# Patient Record
Sex: FEMALE | Race: WHITE | NOT HISPANIC OR LATINO | Employment: FULL TIME | URBAN - METROPOLITAN AREA
[De-identification: names, ages, dates, MRNs, and addresses within clinical notes are randomized per-mention and may not be internally consistent; named-entity substitution may affect disease eponyms.]

---

## 2017-03-31 ENCOUNTER — OFFICE VISIT (OUTPATIENT)
Dept: FAMILY MEDICINE | Facility: CLINIC | Age: 22
End: 2017-03-31
Payer: COMMERCIAL

## 2017-03-31 VITALS
HEART RATE: 88 BPM | OXYGEN SATURATION: 99 % | BODY MASS INDEX: 26.8 KG/M2 | HEIGHT: 64 IN | WEIGHT: 157 LBS | DIASTOLIC BLOOD PRESSURE: 74 MMHG | SYSTOLIC BLOOD PRESSURE: 118 MMHG | TEMPERATURE: 97.2 F

## 2017-03-31 DIAGNOSIS — F90.2 ATTENTION DEFICIT HYPERACTIVITY DISORDER (ADHD), COMBINED TYPE: ICD-10-CM

## 2017-03-31 DIAGNOSIS — F33.1 MAJOR DEPRESSIVE DISORDER, RECURRENT EPISODE, MODERATE (H): Primary | ICD-10-CM

## 2017-03-31 DIAGNOSIS — F41.9 ANXIETY: ICD-10-CM

## 2017-03-31 PROCEDURE — 99213 OFFICE O/P EST LOW 20 MIN: CPT | Performed by: PEDIATRICS

## 2017-03-31 RX ORDER — LISDEXAMFETAMINE DIMESYLATE 70 MG/1
70 CAPSULE ORAL DAILY
Qty: 30 CAPSULE | Refills: 0 | Status: SHIPPED | OUTPATIENT
Start: 2017-05-01 | End: 2017-06-28

## 2017-03-31 RX ORDER — LISDEXAMFETAMINE DIMESYLATE 70 MG/1
70 CAPSULE ORAL DAILY
Qty: 30 CAPSULE | Refills: 0 | Status: SHIPPED | OUTPATIENT
Start: 2017-03-31 | End: 2017-06-28

## 2017-03-31 RX ORDER — LISDEXAMFETAMINE DIMESYLATE 70 MG/1
70 CAPSULE ORAL DAILY
Qty: 30 CAPSULE | Refills: 0 | Status: SHIPPED | OUTPATIENT
Start: 2017-06-01 | End: 2017-06-28

## 2017-03-31 RX ORDER — CITALOPRAM HYDROBROMIDE 10 MG/1
10 TABLET ORAL DAILY
Qty: 90 TABLET | Refills: 0 | Status: SHIPPED | OUTPATIENT
Start: 2017-03-31 | End: 2017-06-28

## 2017-03-31 ASSESSMENT — ANXIETY QUESTIONNAIRES
2. NOT BEING ABLE TO STOP OR CONTROL WORRYING: NOT AT ALL
GAD7 TOTAL SCORE: 8
6. BECOMING EASILY ANNOYED OR IRRITABLE: MORE THAN HALF THE DAYS
7. FEELING AFRAID AS IF SOMETHING AWFUL MIGHT HAPPEN: SEVERAL DAYS
IF YOU CHECKED OFF ANY PROBLEMS ON THIS QUESTIONNAIRE, HOW DIFFICULT HAVE THESE PROBLEMS MADE IT FOR YOU TO DO YOUR WORK, TAKE CARE OF THINGS AT HOME, OR GET ALONG WITH OTHER PEOPLE: VERY DIFFICULT
5. BEING SO RESTLESS THAT IT IS HARD TO SIT STILL: NEARLY EVERY DAY
3. WORRYING TOO MUCH ABOUT DIFFERENT THINGS: NOT AT ALL
1. FEELING NERVOUS, ANXIOUS, OR ON EDGE: SEVERAL DAYS

## 2017-03-31 ASSESSMENT — PATIENT HEALTH QUESTIONNAIRE - PHQ9: 5. POOR APPETITE OR OVEREATING: SEVERAL DAYS

## 2017-03-31 NOTE — PROGRESS NOTES
"SUBJECTIVE:                                                    Sofie Padilla is a 21 year old female who presents to clinic today with self because of:    Chief Complaint   Patient presents with     A.D.H.D     Depression        HPI:  Depression Follow-Up    Status since last visit: Worsened not taking medications    See PHQ-9 for current symptoms.    Other associated symptoms:None    Complicating factors:   Significant life event: No   Current substance abuse: None  Anxiety / Panic symptoms: Yes-  anxiety  PHQ-9  English PHQ-9   Any Language        ADHD Follow-Up    Date of last ADHD office visit: 6-29-16  Status since last visit: Worse not taking medications  Taking controlled (daily) medications as prescribed: No                                                                           ADHD Medication     Amphetamines Disp Start End    lisdexamfetamine (VYVANSE) 70 MG capsule 30 capsule 11/8/2016     Sig - Route: Take 1 capsule (70 mg) by mouth daily - Oral    Patient not taking:  Reported on 3/31/2017       Class: [employer]          School:  Name of SCHOOL: going back to college  Grade: college    Currently on a break.  Working 2 jobs, but struggling. Having difficulty lasting a whole shift, even a 5 hour one. Not really hard work, but \"I have to put all of my mental energy into doing what I'm supposed to do\" \"It's hard to do day after day\"     Sleep: no problems, tries to get at least 9 hours. Sometimes takes a little longer to fall asleep (30\"), but never more than an hr  Home/Family Concerns: Stable  Peer Concerns: None    Co-Morbid Diagnosis: Depression    Currently in counseling: No    Stopped both medications in December to see how she'd do. Has been struggling since.     Medication Benefits:   N/A currently    Medication side effects:  Parent/Patient Concerns with Medications: None when has taken in the past    ROS:  Negative for constitutional, eye, ear, nose, throat, skin, respiratory, cardiac, and " "gastrointestinal other than those outlined in the HPI.    PROBLEM LIST:  Patient Active Problem List    Diagnosis Date Noted     Anxiety 2016     Priority: Medium     Major depression in complete remission (HCC) 2016     Priority: Medium     History of varicella as a child 2014     Verruca plantaris 10/25/2012     Keratosis pilaris 2011     ADHD (attention deficit hyperactivity disorder) 2011     Adderall XR 3/2011 - 2012  Changed to Metadate ER because Adderall XR didn't seem to be working well enough. Concerta hadn't been covered by insurance in the past.  Didn't work any better so tried Vyvanse 10/2012  By 10/2014, didn't seem adequate enough so started trial of Concerta.       Adjustment disorder with disturbance of conduct 2011     Diagnosed at [facility]       Allergic rhinitis 2010     Mild major depression (H) 2010     On Celexa 10 mg since 2010        MEDICATIONS:  Current Outpatient Prescriptions   Medication Sig Dispense Refill     lisdexamfetamine (VYVANSE) 70 MG capsule Take 1 capsule (70 mg) by mouth daily (Patient not taking: Reported on 3/31/2017) 30 capsule 0     citalopram (CELEXA) 40 MG tablet Take 1 tablet (40 mg) by mouth daily (Patient not taking: Reported on 3/31/2017) 90 tablet 1      ALLERGIES:  Allergies   Allergen Reactions     Nkda [No Known Drug Allergies]        Problem list and histories reviewed & adjusted, as indicated.    OBJECTIVE:                                                      /74 (BP Location: Left arm, Patient Position: Chair, Cuff Size: Adult Regular)  Pulse 88  Temp 97.2  F (36.2  C) (Oral)  Ht 5' 4\" (1.626 m)  Wt 157 lb (71.2 kg)  SpO2 99%  Breastfeeding? No  BMI 26.95 kg/m2   Blood pressure percentiles are 79 % systolic and 82 % diastolic based on NHBPEP's 4th Report. Blood pressure percentile targets: 90: 123/78, 95: 127/82, 99 + 5 mmH/94.    GENERAL:  Alert and interactive., LUNGS: "  Clear, HEART:  Normal rate and rhythm.  Normal S1 and S2.  No murmurs. and NEURO:  No tics or tremor.     DIAGNOSTICS:   PHQ-9 SCORE 2/24/2016 6/29/2016 3/31/2017   Total Score - - -   Total Score 3 0 12     SALOMÓN-7 SCORE 2/24/2016 6/29/2016 3/31/2017   Total Score - - -   Total Score 12 1 8       ASSESSMENT/PLAN:                                                    (F33.1) Major depressive disorder, recurrent episode, moderate (H)  (primary encounter diagnosis)  (F41.9) Anxiety  Comment: restart medication, but will start and increase slowly  Plan: citalopram (CELEXA) 10 MG tablet    (F90.2) Attention deficit hyperactivity disorder (ADHD), combined type  Plan: lisdexamfetamine (VYVANSE) 70 MG capsule,         lisdexamfetamine (VYVANSE) 70 MG capsule,         lisdexamfetamine (VYVANSE) 70 MG capsule        Restarting at previous dose      FOLLOW UP: in 3 month(s)    Eliazar Dumont MD

## 2017-03-31 NOTE — NURSING NOTE
"Chief Complaint   Patient presents with     A.D.H.D     Depression       Initial /74 (BP Location: Left arm, Patient Position: Chair, Cuff Size: Adult Regular)  Pulse 88  Temp 97.2  F (36.2  C) (Oral)  Ht 5' 4\" (1.626 m)  Wt 157 lb (71.2 kg)  SpO2 99%  Breastfeeding? No  BMI 26.95 kg/m2 Estimated body mass index is 26.95 kg/(m^2) as calculated from the following:    Height as of this encounter: 5' 4\" (1.626 m).    Weight as of this encounter: 157 lb (71.2 kg).  Medication Reconciliation: complete   Thu RICHARDS MA      "

## 2017-03-31 NOTE — MR AVS SNAPSHOT
After Visit Summary   3/31/2017    Sofie Padilla    MRN: [id number]           Patient Information     Date Of Birth          1995        Visit Information        Provider Department      3/31/2017 10:20 AM Eliazar Dumont MD [facility]        Today's Diagnoses     Need for HPV vaccine    -  1    Major depressive disorder, recurrent episode, moderate (H)        Anxiety        Attention deficit hyperactivity disorder (ADHD), combined type          Care Instructions    [facility]    If you have any questions regarding to your visit please contact your care team:       Team Red:   Clinic Hours Telephone Number   Dr. Graciela De Leon, NP   7am-7pm  Monday - Thursday   7am-5pm  Fridays  [phone]  (Appointment scheduling available 24/7)    Questions about your visit?   Team Line  [phone]   Urgent Care - [place] - 11am-9pm Monday-Friday Saturday-Sunday- 9am-5pm   [place] - 5pm-9pm Monday-Friday Saturday-Sunday- 9am-5pm  [phone] - [facility]  [phone] - [place]       What options do I have for visits at the clinic other than the traditional office visit?  To expand how we care for you, many of our providers are utilizing electronic visits (e-visits) and telephone visits, when medically appropriate, for interactions with their patients rather than a visit in the clinic.   We also offer nurse visits for many medical concerns. Just like any other service, we will bill your insurance company for this type of visit based on time spent on the phone with your provider. Not all insurance companies cover these visits. Please check with your medical insurance if this type of visit is covered. You will be responsible for any charges that are not paid by your insurance.      E-visits via [employer]:  generally incur a $35.00 fee.  Telephone visits:  Time spent on the phone:  "*charged based on time that is spent on the phone in increments of 10 minutes. Estimated cost:   5-10 mins $30.00   11-20 mins. $59.00   21-30 mins. $85.00     Use Nirvah[employer] (secure email communication and access to your chart) to send your primary care provider a message or make an appointment. Ask someone on your Team how to sign up for [employer].  For a Price Quote for your services, please call our Major Aide Line at [phone].      As always, Thank you for trusting us with your health care needs!  Amos Storey          Follow-ups after your visit        Who to contact     If you have questions or need follow up information about today's clinic visit or your schedule please contact [facility] directly at [phone].  Normal or non-critical lab and imaging results will be communicated to you by Nirvahahart, letter or phone within 4 business days after the clinic has received the results. If you do not hear from us within 7 days, please contact the clinic through Nirvahahart or phone. If you have a critical or abnormal lab result, we will notify you by phone as soon as possible.  Submit refill requests through [employer] or call your pharmacy and they will forward the refill request to us. Please allow 3 business days for your refill to be completed.          Additional Information About Your Visit        JJ [employer]MA Information     [employer] lets you send messages to your doctor, view your test results, renew your prescriptions, schedule appointments and more. To sign up, go to [email or web address] . Click on \"Log in\" on the left side of the screen, which will take you to the Welcome page. Then click on \"Sign up Now\" on the right side of the page.     You will be asked to enter the access code listed below, as well as some personal information. Please follow the directions to create your username and password.     Your access code is: [email or web address]  Expires: 6/29/2017 11:18 AM     Your access code will " " in 90 days. If you need help or a new code, please call your [place] clinic or [phone].        Care EveryWhere ID     This is your Care EveryWhere ID. This could be used by other organizations to access your [place] medical records  [id number]        Your Vitals Were     Pulse Temperature Height Pulse Oximetry Breastfeeding? BMI (Body Mass Index)    88 97.2  F (36.2  C) (Oral) 5' 4\" (1.626 m) 99% No 26.95 kg/m2       Blood Pressure from Last 3 Encounters:   17 118/74   16 111/66   16 142/85    Weight from Last 3 Encounters:   17 157 lb (71.2 kg) (86 %)*   16 138 lb (62.6 kg) (71 %)*   16 129 lb 3.2 oz (58.6 kg) (59 %)*     * Growth percentiles are based on [facility] 2-20 Years data.              We Performed the Following     DEPRESSION ACTION PLAN (DAP) Order [[id number]]          Today's Medication Changes          These changes are accurate as of: 3/31/17 11:18 AM.  If you have any questions, ask your nurse or doctor.               Start taking these medicines.        Dose/Directions    citalopram 10 MG tablet   Commonly known as:  celeXA   Used for:  Major depressive disorder, recurrent episode, moderate (H), Anxiety   Started by:  Eliazar Dumont MD        Dose:  10 mg   Take 1 tablet (10 mg) by mouth daily   Quantity:  90 tablet   Refills:  0       * lisdexamfetamine 70 MG capsule   Commonly known as:  VYVANSE   Used for:  Attention deficit hyperactivity disorder (ADHD), combined type   Started by:  Eliazar Dumont MD        Dose:  70 mg   Take 1 capsule (70 mg) by mouth daily   Quantity:  30 capsule   Refills:  0       * lisdexamfetamine 70 MG capsule   Commonly known as:  VYVANSE   Used for:  Attention deficit hyperactivity disorder (ADHD), combined type   Started by:  Eliazar Dumont MD        Dose:  70 mg   Start taking on:  2017   Take 1 capsule (70 mg) by mouth daily   Quantity:  30 capsule   Refills:  0       * " lisdexamfetamine 70 MG capsule   Commonly known as:  VYVANSE   Used for:  Attention deficit hyperactivity disorder (ADHD), combined type   Started by:  Eliazar Dumont MD        Dose:  70 mg   Start taking on:  6/1/2017   Take 1 capsule (70 mg) by mouth daily   Quantity:  30 capsule   Refills:  0       * Notice:  This list has 3 medication(s) that are the same as other medications prescribed for you. Read the directions carefully, and ask your doctor or other care provider to review them with you.         Where to get your medicines      These medications were sent to [facility] [id number] [place], [place] [address] AT [facility] [place] [address], [facility] [address]     Phone:  [phone]     citalopram 10 MG tablet         Some of these will need a paper prescription and others can be bought over the counter.  Ask your nurse if you have questions.     Bring a paper prescription for each of these medications     lisdexamfetamine 70 MG capsule    lisdexamfetamine 70 MG capsule    lisdexamfetamine 70 MG capsule                Primary Care Provider Office Phone # Fax #    Eliazar Dumont [phone]       [address] [address]        Thank you!     Thank you for choosing [facility]  for your care. Our goal is always to provide you with excellent care. Hearing back from our patients is one way we can continue to improve our services. Please take a few minutes to complete the written survey that you may receive in the mail after your visit with us. Thank you!             Your Updated Medication List - Protect others around you: Learn how to safely use, store and throw away your medicines at www.disposemymeds.org.          This list is accurate as of: 3/31/17 11:18 AM.  Always use your most recent med list.                   Brand Name Dispense Instructions for use     citalopram 10 MG tablet    celeXA    90 tablet    Take 1 tablet (10 mg) by mouth daily       * lisdexamfetamine 70 MG capsule    VYVANSE    30 capsule    Take 1 capsule (70 mg) by mouth daily       * lisdexamfetamine 70 MG capsule   Start taking on:  5/1/2017    VYVANSE    30 capsule    Take 1 capsule (70 mg) by mouth daily       * lisdexamfetamine 70 MG capsule   Start taking on:  6/1/2017    VYVANSE    30 capsule    Take 1 capsule (70 mg) by mouth daily       * Notice:  This list has 3 medication(s) that are the same as other medications prescribed for you. Read the directions carefully, and ask your doctor or other care provider to review them with you.

## 2017-03-31 NOTE — LETTER
My Depression Action Plan  Name: Sofie Padilla   Date of Birth 1995  Date: 3/31/2017    My doctor: Eliazar Dumont   My clinic: [address] [address]  [phone]          GREEN    ZONE   Good Control    What it looks like:     Things are going generally well. You have normal up s and down s. You may even feel depressed from time to time, but bad moods usually last less than a day.   What you need to do:  1. Continue to care for yourself (see self care plan)  2. Check your depression survival kit and update it as needed  3. Follow your physician s recommendations including any medication.  4. Do not stop taking medication unless you consult with your physician first.           YELLOW         ZONE Getting Worse    What it looks like:     Depression is starting to interfere with your life.     It may be hard to get out of bed; you may be starting to isolate yourself from others.    Symptoms of depression are starting to last most all day and this has happened for several days.     You may have suicidal thoughts but they are not constant.   What you need to do:     1. Call your care team, your response to treatment will improve if you keep your care team informed of your progress. Yellow periods are signs an adjustment may need to be made.     2. Continue your self-care, even if you have to fake it!    3. Talk to someone in your support network    4. Open up your depression survival kit           RED    ZONE Medical Alert - Get Help    What it looks like:     Depression is seriously interfering with your life.     You may experience these or other symptoms: You can t get out of bed most days, can t work or engage in other necessary activities, you have trouble taking care of basic hygiene, or basic responsibilities, thoughts of suicide or death that will not go away, self-injurious behavior.     What you need to do:  1. Call your  care team and request a same-day appointment. If they are not available (weekends or after hours) call your local crisis line, emergency room or 911.      Electronically signed by: Eliazar Dumont, March 31, 2017    Depression Self Care Plan / Survival Kit    Self-Care for Depression  Here s the deal. Your body and mind are really not as separate as most people think.  What you do and think affects how you feel and how you feel influences what you do and think. This means if you do things that people who feel good do, it will help you feel better.  Sometimes this is all it takes.  There is also a place for medication and therapy depending on how severe your depression is, so be sure to consult with your medical provider and/ or Behavioral Health Consultant if your symptoms are worsening or not improving.     In order to better manage my stress, I will:    Exercise  Get some form of exercise, every day. This will help reduce pain and release endorphins, the  feel good  chemicals in your brain. This is almost as good as taking antidepressants!  This is not the same as joining a gym and then never going! (they count on that by the way ) It can be as simple as just going for a walk or doing some gardening, anything that will get you moving.      Hygiene   Maintain good hygiene (Get out of bed in the morning, Make your bed, Brush your teeth, Take a shower, and Get dressed like you were going to work, even if you are unemployed).  If your clothes don't fit try to get ones that do.    Diet  I will strive to eat foods that are good for me, drink plenty of water, and avoid excessive sugar, caffeine, alcohol, and other mood-altering substances.  Some foods that are helpful in depression are: complex carbohydrates, B vitamins, flaxseed, fish or fish oil, fresh fruits and vegetables.    Psychotherapy  I agree to participate in Individual Therapy (if recommended).    Medication  If prescribed medications, I agree to take  them.  Missing doses can result in serious side effects.  I understand that drinking alcohol, or other illicit drug use, may cause potential side effects.  I will not stop my medication abruptly without first discussing it with my provider.    Staying Connected With Others  I will stay in touch with my friends, family members, and my primary care provider/team.    Use your imagination  Be creative.  We all have a creative side; it doesn t matter if it s oil painting, sand castles, or mud pies! This will also kick up the endorphins.    Witness Beauty  (AKA stop and smell the roses) Take a look outside, even in mid-winter. Notice colors, textures. Watch the squirrels and birds.     Service to others  Be of service to others.  There is always someone else in need.  By helping others we can  get out of ourselves  and remember the really important things.  This also provides opportunities for practicing all the other parts of the program.    Humor  Laugh and be silly!  Adjust your TV habits for less news and crime-drama and more comedy.    Control your stress  Try breathing deep, massage therapy, biofeedback, and meditation. Find time to relax each day.     My support system    Clinic Contact:  Phone number:    Contact 1:  Phone number:    Contact 2:  Phone number:    [employer]/:  Phone number:    Therapist:  Phone number:    Local crisis center:    Phone number:    Other community support:  Phone number:

## 2017-03-31 NOTE — PATIENT INSTRUCTIONS
[facility]    If you have any questions regarding to your visit please contact your care team:       Team Red:   Clinic Hours Telephone Number   Dr. Graciela De Leon, NP   7am-7pm  Monday - Thursday   7am-5pm  Fridays  [phone]  (Appointment scheduling available 24/7)    Questions about your visit?   Team Line  [phone]   Urgent Care - [place] - 11am-9pm Monday-Friday Saturday-Sunday- 9am-5pm   [place] - 5pm-9pm Monday-Friday Saturday-Sunday- 9am-5pm  [phone] - [place]   [phone] - [place]       What options do I have for visits at the clinic other than the traditional office visit?  To expand how we care for you, many of our providers are utilizing electronic visits (e-visits) and telephone visits, when medically appropriate, for interactions with their patients rather than a visit in the clinic.   We also offer nurse visits for many medical concerns. Just like any other service, we will bill your insurance company for this type of visit based on time spent on the phone with your provider. Not all insurance companies cover these visits. Please check with your medical insurance if this type of visit is covered. You will be responsible for any charges that are not paid by your insurance.      E-visits via [employer]:  generally incur a $35.00 fee.  Telephone visits:  Time spent on the phone: *charged based on time that is spent on the phone in increments of 10 minutes. Estimated cost:   5-10 mins $30.00   11-20 mins. $59.00   21-30 mins. $85.00     Use [employer] (secure email communication and access to your chart) to send your primary care provider a message or make an appointment. Ask someone on your Team how to sign up for [employer].  For a Price Quote for your services, please call our Consumer Price Line at [phone].      As always, Thank you for trusting us with your health care needs!  Amos LOPEZ  [place]

## 2017-04-01 ASSESSMENT — PATIENT HEALTH QUESTIONNAIRE - PHQ9: SUM OF ALL RESPONSES TO PHQ QUESTIONS 1-9: 12

## 2017-04-01 ASSESSMENT — ANXIETY QUESTIONNAIRES: GAD7 TOTAL SCORE: 8

## 2017-06-27 DIAGNOSIS — F41.9 ANXIETY: ICD-10-CM

## 2017-06-27 DIAGNOSIS — F33.1 MAJOR DEPRESSIVE DISORDER, RECURRENT EPISODE, MODERATE (H): ICD-10-CM

## 2017-06-27 NOTE — TELEPHONE ENCOUNTER
Routing refill request to provider for review/approval because:  Med was restarted on low dose at last visit.  Has f/u appointment tomorrow      Aline Sherman RN

## 2017-06-27 NOTE — TELEPHONE ENCOUNTER
citalopram (CELEXA) 10 MG tablet     Last Written Prescription Date: 3/31/17  Last Fill Quantity: 90, # refills: 0  Last Office Visit with [facility] primary care provider:  3/31/17   Next 5 appointments (look out 90 days)     Jun 28, 2017  2:00 PM CDT   [facility]    [address]  [place] [address] [phone]                   Last PHQ-9 score on record=   PHQ-9 SCORE 3/31/2017   Total Score -   Total Score 12

## 2017-06-28 ENCOUNTER — OFFICE VISIT (OUTPATIENT)
Dept: FAMILY MEDICINE | Facility: CLINIC | Age: 22
End: 2017-06-28
Payer: COMMERCIAL

## 2017-06-28 VITALS
RESPIRATION RATE: 16 BRPM | BODY MASS INDEX: 24.1 KG/M2 | HEART RATE: 90 BPM | HEIGHT: 63 IN | DIASTOLIC BLOOD PRESSURE: 76 MMHG | WEIGHT: 136 LBS | SYSTOLIC BLOOD PRESSURE: 131 MMHG | TEMPERATURE: 98.7 F

## 2017-06-28 DIAGNOSIS — F90.2 ATTENTION DEFICIT HYPERACTIVITY DISORDER (ADHD), COMBINED TYPE: ICD-10-CM

## 2017-06-28 DIAGNOSIS — L01.00 IMPETIGO: Primary | ICD-10-CM

## 2017-06-28 DIAGNOSIS — F41.9 ANXIETY: ICD-10-CM

## 2017-06-28 DIAGNOSIS — F33.40 MAJOR DEPRESSIVE DISORDER, RECURRENT, IN REMISSION, UNSPECIFIED (H): ICD-10-CM

## 2017-06-28 PROCEDURE — 99213 OFFICE O/P EST LOW 20 MIN: CPT | Performed by: PEDIATRICS

## 2017-06-28 RX ORDER — LISDEXAMFETAMINE DIMESYLATE 70 MG/1
70 CAPSULE ORAL DAILY
Qty: 30 CAPSULE | Refills: 0 | Status: SHIPPED | OUTPATIENT
Start: 2017-06-28 | End: 2017-07-28

## 2017-06-28 RX ORDER — LISDEXAMFETAMINE DIMESYLATE 70 MG/1
70 CAPSULE ORAL DAILY
Qty: 30 CAPSULE | Refills: 0 | Status: SHIPPED | OUTPATIENT
Start: 2017-07-29 | End: 2017-08-28

## 2017-06-28 RX ORDER — LISDEXAMFETAMINE DIMESYLATE 70 MG/1
70 CAPSULE ORAL DAILY
Qty: 30 CAPSULE | Refills: 0 | Status: CANCELLED | OUTPATIENT
Start: 2017-06-28

## 2017-06-28 RX ORDER — CITALOPRAM HYDROBROMIDE 10 MG/1
10 TABLET ORAL DAILY
Qty: 90 TABLET | Refills: 1 | Status: SHIPPED | OUTPATIENT
Start: 2017-06-28 | End: 2018-02-01

## 2017-06-28 RX ORDER — LISDEXAMFETAMINE DIMESYLATE 70 MG/1
70 CAPSULE ORAL DAILY
Qty: 30 CAPSULE | Refills: 0 | Status: SHIPPED | OUTPATIENT
Start: 2017-08-29 | End: 2017-10-04

## 2017-06-28 ASSESSMENT — ANXIETY QUESTIONNAIRES
3. WORRYING TOO MUCH ABOUT DIFFERENT THINGS: NOT AT ALL
1. FEELING NERVOUS, ANXIOUS, OR ON EDGE: SEVERAL DAYS
6. BECOMING EASILY ANNOYED OR IRRITABLE: NOT AT ALL
7. FEELING AFRAID AS IF SOMETHING AWFUL MIGHT HAPPEN: SEVERAL DAYS
5. BEING SO RESTLESS THAT IT IS HARD TO SIT STILL: NOT AT ALL
2. NOT BEING ABLE TO STOP OR CONTROL WORRYING: NOT AT ALL
GAD7 TOTAL SCORE: 2

## 2017-06-28 ASSESSMENT — PATIENT HEALTH QUESTIONNAIRE - PHQ9: 5. POOR APPETITE OR OVEREATING: NOT AT ALL

## 2017-06-28 NOTE — PROGRESS NOTES
SUBJECTIVE:                                                    Sofie Padilla is a 21 year old female who presents to clinic today for the following health issues:      Chief Complaint   Patient presents with     A.D.H.D     Recheck Medication     Restarted her citalopram about 3 months ago and feels it is helping a lot. She had previously been on as high as 40 mg, but restarted at 10mg and she thinks it's right for her.    Still taking the Vyvanse and finds it necessary and effective. No side effects.    New work schedule. Sofie really likes it, feels lighter to her because of how it breaks up the day and no late hours.  6-11 M-F T[employer] Humberto  2-6 M-F Alfred    Went camping last week, really enjoyed it.  On Mon, going to [place] to see sister for a couple weeks    Concerns: impetigo?  Impetigo right nostril since Sun. Using  mupirocin, helping some, but not completely. No other lesions.    Problem list and histories reviewed & adjusted, as indicated.  Additional history: as documented        Patient Active Problem List   Diagnosis     Mild major depression (H)     Allergic rhinitis     Adjustment disorder with disturbance of conduct     ADHD (attention deficit hyperactivity disorder)     Keratosis pilaris     Verruca plantaris     History of varicella as a child     Anxiety     Major depressive disorder, recurrent episode, moderate (H)     Past Surgical History:   Procedure Laterality Date     PE TUBES  age 3     TYMPANOPLASTY  3/19/2013    Procedure: TYMPANOPLASTY;  Right fat graft tympanoplasty;  Surgeon: Jersey Ricardo MD;  Location:  OR       Social History   Substance Use Topics     Smoking status: Never Smoker     Smokeless tobacco: Never Used      Comment: non-smoking household     Alcohol use No     Family History   Problem Relation Age of Onset     C.A.D. Father      DIABETES Father      Pre-diabetes     Arthritis Maternal Grandmother      Cardiovascular Maternal Grandmother      heart disease  "    Alzheimer Disease Paternal Grandfather      Hypertension Paternal Grandfather      Asthma Brother      Depression Mother      Depression Maternal Grandfather      HEART DISEASE Maternal Aunt      enlarged heart     HEART DISEASE Maternal Aunt      enlarged heart     CANCER Maternal Aunt      HEART DISEASE Maternal Uncle      enlarged heart     GASTROINTESTINAL DISEASE Maternal Uncle      colitis     CEREBROVASCULAR DISEASE No family hx of      Thyroid Disease No family hx of      Glaucoma No family hx of      Macular Degeneration No family hx of          Current Outpatient Prescriptions   Medication Sig Dispense Refill     mupirocin (BACTROBAN NASAL) 2 % nasal ointment Apply to affected area 3 times/day for 5-7 days 22 g 0     citalopram (CELEXA) 10 MG tablet Take 1 tablet (10 mg) by mouth daily 90 tablet 1     lisdexamfetamine (VYVANSE) 70 MG capsule Take 1 capsule (70 mg) by mouth daily 30 capsule 0     [START ON 7/29/2017] lisdexamfetamine (VYVANSE) 70 MG capsule Take 1 capsule (70 mg) by mouth daily 30 capsule 0     [START ON 8/29/2017] lisdexamfetamine (VYVANSE) 70 MG capsule Take 1 capsule (70 mg) by mouth daily 30 capsule 0     lisdexamfetamine (VYVANSE) 70 MG capsule Take 1 capsule (70 mg) by mouth daily 30 capsule 0     Allergies   Allergen Reactions     Nkda [No Known Drug Allergies]        Reviewed and updated as needed this visit by clinical staff  Tobacco  Allergies  Meds  Med Hx  Surg Hx  Fam Hx  Soc Hx      Reviewed and updated as needed this visit by Provider         ROS:  Constitutional, HEENT, cardiovascular, pulmonary, gi and gu systems are negative, except as otherwise noted.    OBJECTIVE:     /76  Pulse 90  Temp 98.7  F (37.1  C)  Resp 16  Ht 5' 2.5\" (1.588 m)  Wt 136 lb (61.7 kg)  BMI 24.48 kg/m2  Body mass index is 24.48 kg/(m^2).  GENERAL: healthy, alert and no distress  SKIN: crusting with mild erythema along right nare extending towards philtrum  NECK: no adenopathy, " no asymmetry, masses, or scars and thyroid normal to palpation  RESP: lungs clear to auscultation - no rales, rhonchi or wheezes  CV: regular rate and rhythm, normal S1 S2, no S3 or S4, no murmur, click or rub  PSYCH: mentation appears normal, affect normal/bright    Diagnostic Test Results:  none     ASSESSMENT/PLAN:     Depression; recurrent episode-- Full remission   Associated with the following complications:    None   Plan:  No changes in the patient's current treatment plan    (L01.00) Impetigo  (primary encounter diagnosis)  Plan: mupirocin (BACTROBAN NASAL) 2 % nasal ointment        Discussed instructions on proper medication use and possible side effects.    (F33.40) Major depressive disorder, recurrent, in remission, unspecified (H)  (F41.9) Anxiety  Comment: doing very well  Plan: citalopram (CELEXA) 10 MG tablet            (F90.2) Attention deficit hyperactivity disorder (ADHD), combined type  Comment: stable  Plan: lisdexamfetamine (VYVANSE) 70 MG capsule,         lisdexamfetamine (VYVANSE) 70 MG capsule,         lisdexamfetamine (VYVANSE) 70 MG capsule              FUTURE APPOINTMENTS:       - Follow-up visit in 6 months    Eliazar Dumont MD  [facility]

## 2017-06-28 NOTE — NURSING NOTE
"Chief Complaint   Patient presents with     A.D.H.D     Recheck Medication       Initial /76  Pulse 90  Temp 98.7  F (37.1  C)  Resp 16  Ht 5' 2.5\" (1.588 m)  Wt 136 lb (61.7 kg)  BMI 24.48 kg/m2 Estimated body mass index is 24.48 kg/(m^2) as calculated from the following:    Height as of this encounter: 5' 2.5\" (1.588 m).    Weight as of this encounter: 136 lb (61.7 kg).  Medication Reconciliation: complete     Jose F Galeas. MA      "

## 2017-06-28 NOTE — MR AVS SNAPSHOT
After Visit Summary   6/28/2017    Sofie Padilla    MRN: [id number]           Patient Information     Date Of Birth          1995        Visit Information        Provider Department      6/28/2017 2:00 PM Eliazar Dumont MD [facility]        Today's Diagnoses     Impetigo    -  1    Major depressive disorder, recurrent episode, moderate (H)        Anxiety        Attention deficit hyperactivity disorder (ADHD), combined type          Care Instructions    [facility]    If you have any questions regarding to your visit please contact your care team:       Team Red:   Clinic Hours Telephone Number   Dr. Graciela Dumont  (pediatrics)  Abbey De Leon NP 7am-7pm  Monday - Thursday   7am-5pm  Fridays  [phone] (fax)    [place] OSBORN  [phone]   Urgent Care - [place] and [place] Monday-Friday  [place] - 11am-8pm  Saturday-Sunday  Both sites - 9am-5pm  [phone] - [facility]  [phone] - [place]       What options do I have for visits at the clinic other than the traditional office visit?  To expand how we care for you, many of our providers are utilizing electronic visits (e-visits) and telephone visits, when medically appropriate, for interactions with their patients rather than a visit in the clinic.   We also offer nurse visits for many medical concerns. Just like any other service, we will bill your insurance company for this type of visit based on time spent on the phone with your provider. Not all insurance companies cover these visits. Please check with your medical insurance if this type of visit is covered. You will be responsible for any charges that are not paid by your insurance.      E-visits via [employer]:  generally incur a $35.00 fee.  Telephone visits:  Time spent on the phone: *charged based on time that is spent on the phone in increments of 10 minutes. Estimated  "cost:   5-10 mins $30.00   11-20 mins. $59.00   21-30 mins. $85.00     As always, Thank you for trusting us with your health care needs!                      Follow-ups after your visit        Who to contact     If you have questions or need follow up information about today's clinic visit or your schedule please contact [facility] directly at [phone].  Normal or non-critical lab and imaging results will be communicated to you by eCareerhart, letter or phone within 4 business days after the clinic has received the results. If you do not hear from us within 7 days, please contact the clinic through OneEyeAntt or phone. If you have a critical or abnormal lab result, we will notify you by phone as soon as possible.  Submit refill requests through AutomateIt or call your pharmacy and they will forward the refill request to us. Please allow 3 business days for your refill to be completed.          Additional Information About Your Visit        eCareerharRF Controls Information     AutomateIt lets you send messages to your doctor, view your test results, renew your prescriptions, schedule appointments and more. To sign up, go to [email or web address] . Click on \"Log in\" on the left side of the screen, which will take you to the Welcome page. Then click on \"Sign up Now\" on the right side of the page.     You will be asked to enter the access code listed below, as well as some personal information. Please follow the directions to create your username and password.     Your access code is: [email or web address]  Expires: 2017 11:18 AM     Your access code will  in 90 days. If you need help or a new code, please call your [facility] clinic or [phone].        Care EveryWhere ID     This is your Care EveryWhere ID. This could be used by other organizations to access your [facility] medical records  [id number]        Your Vitals Were     Pulse Temperature Respirations Height BMI (Body Mass Index)       90 98.7  F (37.1  C) 16 5' " "2.5\" (1.588 m) 24.48 kg/m2        Blood Pressure from Last 3 Encounters:   06/28/17 131/76   03/31/17 118/74   06/29/16 111/66    Weight from Last 3 Encounters:   06/28/17 136 lb (61.7 kg) (64 %)*   03/31/17 157 lb (71.2 kg) (86 %)*   06/29/16 138 lb (62.6 kg) (71 %)*     * Growth percentiles are based on [facility] 2-20 Years data.              Today, you had the following     No orders found for display         Today's Medication Changes          These changes are accurate as of: 6/28/17  2:51 PM.  If you have any questions, ask your nurse or doctor.               Start taking these medicines.        Dose/Directions    mupirocin 2 % nasal ointment   Commonly known as:  BACTROBAN NASAL   Used for:  Impetigo   Started by:  Eliazar Dumont MD        Apply to affected area 3 times/day for 5-7 days   Quantity:  22 g   Refills:  0         These medicines have changed or have updated prescriptions.        Dose/Directions    * lisdexamfetamine 70 MG capsule   Commonly known as:  VYVANSE   This may have changed:  Another medication with the same name was added. Make sure you understand how and when to take each.   Used for:  Attention deficit hyperactivity disorder (ADHD), combined type   Changed by:  Eliazar Dumont MD        Dose:  70 mg   Take 1 capsule (70 mg) by mouth daily   Quantity:  30 capsule   Refills:  0       * lisdexamfetamine 70 MG capsule   Commonly known as:  VYVANSE   This may have changed:  You were already taking a medication with the same name, and this prescription was added. Make sure you understand how and when to take each.   Used for:  Attention deficit hyperactivity disorder (ADHD), combined type   Changed by:  Eliazar Dumont MD        Dose:  70 mg   Take 1 capsule (70 mg) by mouth daily   Quantity:  30 capsule   Refills:  0       * lisdexamfetamine 70 MG capsule   Commonly known as:  VYVANSE   This may have changed:  You were already taking a medication " with the same name, and this prescription was added. Make sure you understand how and when to take each.   Used for:  Attention deficit hyperactivity disorder (ADHD), combined type   Changed by:  Eliazar Dumont MD        Dose:  70 mg   Start taking on:  7/29/2017   Take 1 capsule (70 mg) by mouth daily   Quantity:  30 capsule   Refills:  0       * lisdexamfetamine 70 MG capsule   Commonly known as:  VYVANSE   This may have changed:  You were already taking a medication with the same name, and this prescription was added. Make sure you understand how and when to take each.   Used for:  Attention deficit hyperactivity disorder (ADHD), combined type   Changed by:  Eliazar Dumotn MD        Dose:  70 mg   Start taking on:  8/29/2017   Take 1 capsule (70 mg) by mouth daily   Quantity:  30 capsule   Refills:  0       * Notice:  This list has 4 medication(s) that are the same as other medications prescribed for you. Read the directions carefully, and ask your doctor or other care provider to review them with you.         Where to get your medicines      These medications were sent to [facility] [address] [place] [address] RD S AT [facility] [place] [id number], [place] [place] [address]     Phone:  [phone]     citalopram 10 MG tablet    mupirocin 2 % nasal ointment         Some of these will need a paper prescription and others can be bought over the counter.  Ask your nurse if you have questions.     Bring a paper prescription for each of these medications     lisdexamfetamine 70 MG capsule    lisdexamfetamine 70 MG capsule    lisdexamfetamine 70 MG capsule                Primary Care Provider Office Phone # Fax #    Eliazar Dumont [phone]       [facility] [address] [place] [address]        Equal Access to Services     TOSHA ARMENTA AH: ashutosh Altamirano  judy kassi scott, marline dumont ah. So [facility] [phone].    ATENCIÓN: Si vinita owen, tiene a noyola disposición servicios gratuitos de asistencia lingüística. Llame al [phone].    We comply with applicable federal civil rights laws and [place] laws. We do not discriminate on the basis of race, color, national origin, age, disability sex, sexual orientation or gender identity.            Thank you!     Thank you for choosing [facility]3] [facility]  for your care. Our goal is always to provide you with excellent care. Hearing back from our patients is one way we can continue to improve our services. Please take a few minutes to complete the written survey that you may receive in the mail after your visit with us. Thank you!             Your Updated Medication List - Protect others around you: Learn how to safely use, store and throw away your medicines at www.disposemymeds.org.          This list is accurate as of: 6/28/17  2:51 PM.  Always use your most recent med list.                   Brand Name Dispense Instructions for use Diagnosis    citalopram 10 MG tablet    celeXA    90 tablet    Take 1 tablet (10 mg) by mouth daily    Major depressive disorder, recurrent episode, moderate (H), Anxiety       * lisdexamfetamine 70 MG capsule    VYVANSE    30 capsule    Take 1 capsule (70 mg) by mouth daily    Attention deficit hyperactivity disorder (ADHD), combined type       * lisdexamfetamine 70 MG capsule    VYVANSE    30 capsule    Take 1 capsule (70 mg) by mouth daily    Attention deficit hyperactivity disorder (ADHD), combined type       * lisdexamfetamine 70 MG capsule   Start taking on:  7/29/2017    VYVANSE    30 capsule    Take 1 capsule (70 mg) by mouth daily    Attention deficit hyperactivity disorder (ADHD), combined type       * lisdexamfetamine 70 MG capsule   Start taking on:  8/29/2017    VYVANSE    30 capsule    Take 1 capsule (70 mg) by mouth daily     Attention deficit hyperactivity disorder (ADHD), combined type       mupirocin 2 % nasal ointment    BACTROBAN NASAL    22 g    Apply to affected area 3 times/day for 5-7 days    Impetigo       * Notice:  This list has 4 medication(s) that are the same as other medications prescribed for you. Read the directions carefully, and ask your doctor or other care provider to review them with you.

## 2017-06-28 NOTE — PATIENT INSTRUCTIONS
[facility]    If you have any questions regarding to your visit please contact your care team:       Team Red:   Clinic Hours Telephone Number   Dr. Graciela Dumont  (pediatrics)  Abbey De Leon NP 7am-7pm  Monday - Thursday   7am-5pm  Fridays  [phone] (fax)    [place]  [phone]   Urgent Care - [place] and [place] Monday-Friday  [place] - 11am-8pm  Saturday-Sunday  Both sites - 9am-5pm  [phone] - [facility]  [phone] - [place]       What options do I have for visits at the clinic other than the traditional office visit?  To expand how we care for you, many of our providers are utilizing electronic visits (e-visits) and telephone visits, when medically appropriate, for interactions with their patients rather than a visit in the clinic.   We also offer nurse visits for many medical concerns. Just like any other service, we will bill your insurance company for this type of visit based on time spent on the phone with your provider. Not all insurance companies cover these visits. Please check with your medical insurance if this type of visit is covered. You will be responsible for any charges that are not paid by your insurance.      E-visits via stylefruits:  generally incur a $35.00 fee.  Telephone visits:  Time spent on the phone: *charged based on time that is spent on the phone in increments of 10 minutes. Estimated cost:   5-10 mins $30.00   11-20 mins. $59.00   21-30 mins. $85.00     As always, Thank you for trusting us with your health care needs!

## 2017-06-29 RX ORDER — CITALOPRAM HYDROBROMIDE 10 MG/1
TABLET ORAL
Qty: 90 TABLET | Refills: 0 | OUTPATIENT
Start: 2017-06-29

## 2017-06-29 ASSESSMENT — ANXIETY QUESTIONNAIRES: GAD7 TOTAL SCORE: 2

## 2017-06-29 ASSESSMENT — PATIENT HEALTH QUESTIONNAIRE - PHQ9: SUM OF ALL RESPONSES TO PHQ QUESTIONS 1-9: 2

## 2017-09-12 ENCOUNTER — TELEPHONE (OUTPATIENT)
Dept: PEDIATRICS | Facility: CLINIC | Age: 22
End: 2017-09-12

## 2017-09-12 NOTE — TELEPHONE ENCOUNTER
Patient has the diagnosis of Depression and is due for a PHQ-9 for depression remission follow-up. Please complete, thank you.

## 2017-10-04 ENCOUNTER — TELEPHONE (OUTPATIENT)
Dept: PEDIATRICS | Facility: CLINIC | Age: 22
End: 2017-10-04

## 2017-10-04 DIAGNOSIS — F90.2 ATTENTION DEFICIT HYPERACTIVITY DISORDER (ADHD), COMBINED TYPE: ICD-10-CM

## 2017-10-04 RX ORDER — LISDEXAMFETAMINE DIMESYLATE 70 MG/1
70 CAPSULE ORAL DAILY
Qty: 30 CAPSULE | Refills: 0 | Status: CANCELLED | OUTPATIENT
Start: 2017-10-04

## 2017-10-04 RX ORDER — LISDEXAMFETAMINE DIMESYLATE 70 MG/1
70 CAPSULE ORAL DAILY
Qty: 30 CAPSULE | Refills: 0 | Status: SHIPPED | OUTPATIENT
Start: 2017-10-04 | End: 2017-11-03

## 2017-10-04 RX ORDER — LISDEXAMFETAMINE DIMESYLATE 70 MG/1
70 CAPSULE ORAL DAILY
Qty: 30 CAPSULE | Refills: 0 | Status: SHIPPED | OUTPATIENT
Start: 2017-11-04 | End: 2017-12-04

## 2017-10-04 RX ORDER — LISDEXAMFETAMINE DIMESYLATE 70 MG/1
70 CAPSULE ORAL DAILY
Qty: 30 CAPSULE | Refills: 0 | Status: SHIPPED | OUTPATIENT
Start: 2017-12-05 | End: 2018-02-01

## 2017-10-04 ASSESSMENT — ANXIETY QUESTIONNAIRES
6. BECOMING EASILY ANNOYED OR IRRITABLE: NOT AT ALL
7. FEELING AFRAID AS IF SOMETHING AWFUL MIGHT HAPPEN: SEVERAL DAYS
5. BEING SO RESTLESS THAT IT IS HARD TO SIT STILL: NOT AT ALL
IF YOU CHECKED OFF ANY PROBLEMS ON THIS QUESTIONNAIRE, HOW DIFFICULT HAVE THESE PROBLEMS MADE IT FOR YOU TO DO YOUR WORK, TAKE CARE OF THINGS AT HOME, OR GET ALONG WITH OTHER PEOPLE: NOT DIFFICULT AT ALL
GAD7 TOTAL SCORE: 3
1. FEELING NERVOUS, ANXIOUS, OR ON EDGE: SEVERAL DAYS
2. NOT BEING ABLE TO STOP OR CONTROL WORRYING: NOT AT ALL
3. WORRYING TOO MUCH ABOUT DIFFERENT THINGS: SEVERAL DAYS

## 2017-10-04 ASSESSMENT — PATIENT HEALTH QUESTIONNAIRE - PHQ9
SUM OF ALL RESPONSES TO PHQ QUESTIONS 1-9: 2
5. POOR APPETITE OR OVEREATING: NOT AT ALL

## 2017-10-04 NOTE — TELEPHONE ENCOUNTER
Matias Carrizales, I m Jose F Galeas. I work with Dr. Dumont at [facility]. He/she noticed in your chart that you are due for a patient health questionnaire. We would like to complete that today as Dr. Dumont cares about your health.        Called patient; Called patient, completed PHQ9. PHQ9 score: 3. (If patient has sucidal thoughts discuss with Team RN ASAP) <5 PASS, >5 FAIL Needs follow up appointment.  If patient refuses appointment please ask them if they would be willing to speak to the Team RN for further support over the phone.     If PHQ-9 is less than 5, close encounter. If PHQ-9 is 5 or greater, recommend that patient schedule follow up appointment with primary provider (in office, e-visit or phone visit) for medication follow up and evaluation. If positive suicidal thoughts, huddle with RN or provider today and route encounter.     Appointment Made? No    Jose F Galeas

## 2017-10-04 NOTE — TELEPHONE ENCOUNTER
Prescriptions printed and signed.    Please notify parent/patient prescriptions ready.  Prescriptions are in  basket    Dr. Suki Dumont

## 2017-10-04 NOTE — TELEPHONE ENCOUNTER
Reason for Call:  Other call back    Detailed comments: patient would like to request a refill for the prescription Vyvanse as the patient is now out of this medication until patient scheduled an 3 month follow up visit    Ph[facility]    Phone Number Patient can be reached at: Home number on file [phone] (home)    Best Time: today    Can we leave a detailed message on this number? YES    Call taken on 10/4/2017 at 1:38 PM by Angel Aguirre

## 2017-10-04 NOTE — TELEPHONE ENCOUNTER
lisdexamfetamine (VYVANSE) 70 MG capsule  Last Written Prescription Date:  8/29/2017  Last Fill Quantity: 30,   # refills: 0  Last Office Visit with [facility] or [facility] prescribing provider: 6/28/2017  Future Office visit:       Routing refill request to provider for review/approval because:  Drug not on the [facility], [facility] or [facility] refill protocol or controlled substance

## 2017-10-05 ASSESSMENT — ANXIETY QUESTIONNAIRES: GAD7 TOTAL SCORE: 3

## 2017-11-01 ENCOUNTER — TELEPHONE (OUTPATIENT)
Dept: FAMILY MEDICINE | Facility: CLINIC | Age: 22
End: 2017-11-01

## 2017-11-01 ENCOUNTER — HOSPITAL ENCOUNTER (EMERGENCY)
Facility: CLINIC | Age: 22
Discharge: HOME | End: 2017-11-01
Attending: EMERGENCY MEDICINE | Admitting: EMERGENCY MEDICINE
Payer: COMMERCIAL

## 2017-11-01 VITALS
HEIGHT: 64 IN | HEART RATE: 76 BPM | TEMPERATURE: 97.9 F | SYSTOLIC BLOOD PRESSURE: 127 MMHG | DIASTOLIC BLOOD PRESSURE: 72 MMHG | RESPIRATION RATE: 16 BRPM | OXYGEN SATURATION: 97 % | BODY MASS INDEX: 23.05 KG/M2 | WEIGHT: 135 LBS
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
## 2017-11-01 DIAGNOSIS — S09.90XA CLOSED HEAD INJURY, INITIAL ENCOUNTER: ICD-10-CM

## 2017-11-01 PROCEDURE — 99282 EMERGENCY DEPT VISIT SF MDM: CPT

## 2017-11-01 ASSESSMENT — ENCOUNTER SYMPTOMS
NAUSEA: 1
HEADACHES: 1
VOMITING: 1

## 2017-11-01 NOTE — LETTER
To Whom it may concern:      Sofie Padilla was seen in our Emergency Department today, 11/01/17.  I expect her condition to improve over the next 2 days.  She may return to work/school when improved.    Sincerely,        Jaswinder Monae, DO
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
## 2017-11-01 NOTE — TELEPHONE ENCOUNTER
Pt walked in with mother.  Pt fell off the deck last night wrestling with brother, ~ a 2 foot fall and landed on something metal.  This am was dizzy and threw up at work, was sent home from work.  Advised pt and mother to go into ER.  Pt agreeable.  Page Chinchilla RN

## 2017-11-01 NOTE — DISCHARGE INSTRUCTIONS
Return to the emergency department or seek medical care as instructed if your symptoms fail to improve or significantly worsen.    Take Acetaminophen (aka Tylenol) and/or ibuprofen (aka Motrin/Advil, 600mg up to 4 times per day with food) as needed for symptom/pain relief; use as directed.    Ice area of pain for 20 minutes four times per day for the next two days    Follow-up as indicated on page 1.  Maintain adequate hydration and get plenty of rest.

## 2017-11-01 NOTE — ED AVS SNAPSHOT
Emergency Department    [address] [facility] [address]    Phone:  [phone]    Fax:  [phone]                                       Sofie Padilla   MRN: [id number]    Department:   Emergency Department   Date of Visit:  11/1/2017           Patient Information     Date Of Birth          1995        Your diagnoses for this visit were:     Closed head injury, initial encounter     Fall, initial encounter        You were seen by Jaswinder Monae DO.      Follow-up Information     Follow up with Eliazar Dumont MD In 4 days.    Specialty:  Pediatrics    Why:  As needed    Contact information:    [address] [facility] [address] [phone]          Follow up with  Emergency Department.    Specialty:  EMERGENCY MEDICINE    Why:  If symptoms worsen    Contact information:    [address] [facility] [id number] [phone]        Discharge Instructions       Return to the emergency department or seek medical care as instructed if your symptoms fail to improve or significantly worsen.    Take Acetaminophen (aka Tylenol) and/or ibuprofen (aka Motrin/Advil, 600mg up to 4 times per day with food) as needed for symptom/pain relief; use as directed.    Ice area of pain for 20 minutes four times per day for the next two days    Follow-up as indicated on page 1.  Maintain adequate hydration and get plenty of rest.      Discharge References/Attachments     HEAD INJURY, NO WAKE-UP (ADULT) (ENGLISH)      Future Appointments        Provider Department Dept Phone Center    11/3/2017 8:00 AM Eliazar Dumont MD [facility] [phone] [facility]      24 Hour Appointment Hotline       To make an appointment at any [facility], call [phone] ([phone]). If you don't have a family doctor or clinic, we will help you find one. [facility] are conveniently located to serve the needs of you and your  family.             Review of your medicines      Our records show that you are taking the medicines listed below. If these are incorrect, please call your family doctor or clinic.        Dose / Directions Last dose taken    citalopram 10 MG tablet   Commonly known as:  celeXA   Dose:  10 mg   Quantity:  90 tablet        Take 1 tablet (10 mg) by mouth daily   Refills:  1        * lisdexamfetamine 70 MG capsule   Commonly known as:  VYVANSE   Dose:  70 mg   Quantity:  30 capsule        Take 1 capsule (70 mg) by mouth daily   Refills:  0        * lisdexamfetamine 70 MG capsule   Commonly known as:  VYVANSE   Dose:  70 mg   Quantity:  30 capsule        Take 1 capsule (70 mg) by mouth daily   Refills:  0        * lisdexamfetamine 70 MG capsule   Commonly known as:  VYVANSE   Dose:  70 mg   Quantity:  30 capsule   Start taking on:  11/4/2017        Take 1 capsule (70 mg) by mouth daily   Refills:  0        * lisdexamfetamine 70 MG capsule   Commonly known as:  VYVANSE   Dose:  70 mg   Quantity:  30 capsule   Start taking on:  12/5/2017        Take 1 capsule (70 mg) by mouth daily   Refills:  0        mupirocin 2 % nasal ointment   Commonly known as:  BACTROBAN NASAL   Quantity:  22 g        Apply to affected area 3 times/day for 5-7 days   Refills:  0        * Notice:  This list has 4 medication(s) that are the same as other medications prescribed for you. Read the directions carefully, and ask your doctor or other care provider to review them with you.            Orders Needing Specimen Collection     None      Pending Results     No orders found from 10/30/2017 to 11/2/2017.            Pending Culture Results     No orders found from 10/30/2017 to 11/2/2017.            Pending Results Instructions     If you had any lab results that were not finalized at the time of your Discharge, you can call the ED Lab Result RN at [phone]. You will be contacted by this team for any positive Lab results or changes in treatment.  The nurses are available 7 days a week from 10A to 6:30P.  You can leave a message 24 hours per day and they will return your call.        Test Results From Your Hospital Stay               Clinical Quality Measure: Blood Pressure Screening     Your blood pressure was checked while you were in the emergency department today. The last reading we obtained was  BP: 127/72 . Please read the guidelines below about what these numbers mean and what you should do about them.  If your systolic blood pressure (the top number) is less than 120 and your diastolic blood pressure (the bottom number) is less than 80, then your blood pressure is normal. There is nothing more that you need to do about it.  If your systolic blood pressure (the top number) is 120-139 or your diastolic blood pressure (the bottom number) is 80-89, your blood pressure may be higher than it should be. You should have your blood pressure rechecked within a year by a primary care provider.  If your systolic blood pressure (the top number) is 140 or greater or your diastolic blood pressure (the bottom number) is 90 or greater, you may have high blood pressure. High blood pressure is treatable, but if left untreated over time it can put you at risk for heart attack, stroke, or kidney failure. You should have your blood pressure rechecked by a primary care provider within the next 4 weeks.  If your provider in the emergency department today gave you specific instructions to follow-up with your doctor or provider even sooner than that, you should follow that instruction and not wait for up to 4 weeks for your follow-up visit.        Thank you for choosing [facility]       Thank you for choosing [facility] for your care. Our goal is always to provide you with excellent care. Hearing back from our patients is one way we can continue to improve our services. Please take a few minutes to complete the written survey that you may receive in the mail after you visit with  "us. Thank you!        [employer] Information     [employer] lets you send messages to your doctor, view your test results, renew your prescriptions, schedule appointments and more. To sign up, go to [email or web address] . Click on \"Log in\" on the left side of the screen, which will take you to the Welcome page. Then click on \"Sign up Now\" on the right side of the page.     You will be asked to enter the access code listed below, as well as some personal information. Please follow the directions to create your username and password.     Your access code is: [email or web address]  Expires: 2018 10:30 AM     Your access code will  in 90 days. If you need help or a new code, please call your [place] clinic or [phone].        Care EveryWhere ID     This is your Care EveryWhere ID. This could be used by other organizations to access your [place] medical records  [id number]        Equal Access to Services     TOSHA ARMENTA : Hadii mica ochoa hadasho Sodanielali, waaxda luqadaha, qaybta kaalmada adeegyada, marline dumont . So [facility] [phone].    ATENCIÓN: Si habla español, tiene a noyola disposición servicios gratuitos de asistencia lingüística. Llame al [phone].    We comply with applicable federal civil rights laws and [place] laws. We do not discriminate on the basis of race, color, national origin, age, disability, sex, sexual orientation, or gender identity.            After Visit Summary       This is your record. Keep this with you and show to your community pharmacist(s) and doctor(s) at your next visit.                  "

## 2017-11-01 NOTE — ED AVS SNAPSHOT
Emergency Department    [address] [facility] [address]    Phone:  [phone]    Fax:  [phone]                                       Sofie Padilla   MRN: [id number]    Department:   Emergency Department   Date of Visit:  11/1/2017           After Visit Summary Signature Page     I have received my discharge instructions, and my questions have been answered. I have discussed any challenges I see with this plan with the nurse or doctor.    ..........................................................................................................................................  Patient/Patient Representative Signature      ..........................................................................................................................................  Patient Representative Print Name and Relationship to Patient    ..................................................               ................................................  Date                                            Time    ..........................................................................................................................................  Reviewed by Signature/Title    ...................................................              ..............................................  Date                                                            Time

## 2017-11-08 ENCOUNTER — TELEPHONE (OUTPATIENT)
Dept: PEDIATRICS | Facility: CLINIC | Age: 22
End: 2017-11-08

## 2017-11-08 NOTE — TELEPHONE ENCOUNTER
Please initiate prior authorization    -Any medications the patient has tried and failed or any contraindications. Adderall XR, Metadate ER, Metadate CD, Concerta - none effective enough     -Is the patient currently on this medication, or has tried before? Has been on for along time, first tried 10/2012     -What is the diagnosis? ADHD - combined type     - Justification or other information that me by helpful.    Eliazar Dumont MD

## 2017-11-08 NOTE — TELEPHONE ENCOUNTER
Reason for Call:  Other prescription    Detailed comments:  Patient calling. She is checking on the status of this prior auth.     Phone Number Patient can be reached at: Home number on file [phone] (home)    Best Time:  Any     Can we leave a detailed message on this number? YES    Call taken on 11/8/2017 at 12:34 PM by Renetta Wilson

## 2017-11-08 NOTE — TELEPHONE ENCOUNTER
Called to start the PA over the phone for: lisdexamfetamine (VYVANSE) 70 MG capsule 30 capsule Sig: Take 1 capsule (70 mg) by mouth daily    4-5 calender days/ can take up to 10 calender days    They will be faxing this to the form to be completed. Ariadna Lambert,     lisdexamfetamine (VYVANSE) 10/6/2012- current  Adderall XR 3/10/2011-6/17/2013  Metadate ER 7/27/2012-10/5/2012  Metadate CD 8/24/2012- 10/5/2012  Concerta 3/9/2011-11/11/2014

## 2017-11-08 NOTE — TELEPHONE ENCOUNTER
Received fax from pharmacy stating patient requires Prior Authorization for lisdexamfetamine (VYVANSE) 70 MG capsule     Insurance information:  Name:   Phone number: [phone]  ID number: [id number]    Provider to address. Message route to Dr. Dumont. Initiate prior authorization or change medication?  Please advise.      **If a prior authorization is to be initiated, please list the following:    -Any medications the patient has tried and failed or any contraindications. **    -Is the patient currently on this medication, or has tried before? **    -What is the diagnosis? **    - Justification or other information that me by helpful. **    Bella Stoner MA

## 2017-11-09 NOTE — TELEPHONE ENCOUNTER
Completed PA form and faxed it back to [employer].     Awaiting to hear back via a fax out come to the Red Team Fax. Ariadna Lambert,

## 2017-11-09 NOTE — TELEPHONE ENCOUNTER
Received a fax back of an Approval lisdexamfetamine (VYVANSE) 70 MG capsule Start date 11/7/2017-11/7/2018    Called the patient's preferred pharmacy [facility] [address] [place] [address] and informed them of the approval. They will contact the patient when this is ready for . Ariadna Lambert,

## 2017-12-28 DIAGNOSIS — F41.9 ANXIETY: ICD-10-CM

## 2017-12-28 DIAGNOSIS — F33.40 MAJOR DEPRESSIVE DISORDER, RECURRENT, IN REMISSION, UNSPECIFIED (H): ICD-10-CM

## 2017-12-29 NOTE — TELEPHONE ENCOUNTER
PHQ-9 SCORE 3/31/2017 6/28/2017 10/4/2017   Total Score - - -   Total Score 12 2 2     Pt due for recheck depression. Pt was a NO SHOW.  Please call to schedule appt.- then send to .  Carla Soto RN

## 2018-01-31 RX ORDER — CITALOPRAM HYDROBROMIDE 10 MG/1
TABLET ORAL
Qty: 90 TABLET | Refills: 0 | OUTPATIENT
Start: 2018-01-31

## 2018-02-01 ENCOUNTER — OFFICE VISIT (OUTPATIENT)
Dept: PEDIATRICS | Facility: CLINIC | Age: 23
End: 2018-02-01
Payer: COMMERCIAL

## 2018-02-01 ENCOUNTER — TELEPHONE (OUTPATIENT)
Dept: FAMILY MEDICINE | Facility: CLINIC | Age: 23
End: 2018-02-01

## 2018-02-01 VITALS
HEIGHT: 63 IN | HEART RATE: 81 BPM | BODY MASS INDEX: 23.32 KG/M2 | OXYGEN SATURATION: 97 % | DIASTOLIC BLOOD PRESSURE: 62 MMHG | TEMPERATURE: 97.2 F | SYSTOLIC BLOOD PRESSURE: 110 MMHG | WEIGHT: 131.6 LBS

## 2018-02-01 DIAGNOSIS — F41.9 ANXIETY: ICD-10-CM

## 2018-02-01 DIAGNOSIS — N91.5 OLIGOMENORRHEA, UNSPECIFIED TYPE: ICD-10-CM

## 2018-02-01 DIAGNOSIS — F33.40 MAJOR DEPRESSIVE DISORDER, RECURRENT, IN REMISSION, UNSPECIFIED (H): ICD-10-CM

## 2018-02-01 DIAGNOSIS — N91.1 SECONDARY AMENORRHEA: ICD-10-CM

## 2018-02-01 DIAGNOSIS — F90.2 ATTENTION DEFICIT HYPERACTIVITY DISORDER (ADHD), COMBINED TYPE: Primary | ICD-10-CM

## 2018-02-01 PROCEDURE — 99214 OFFICE O/P EST MOD 30 MIN: CPT | Performed by: PEDIATRICS

## 2018-02-01 RX ORDER — MEDROXYPROGESTERONE ACETATE 10 MG
10 TABLET ORAL DAILY
Qty: 10 TABLET | Refills: 0 | Status: SHIPPED | OUTPATIENT
Start: 2018-02-01 | End: 2018-02-11

## 2018-02-01 RX ORDER — LISDEXAMFETAMINE DIMESYLATE 70 MG/1
70 CAPSULE ORAL DAILY
Qty: 30 CAPSULE | Refills: 0 | Status: SHIPPED | OUTPATIENT
Start: 2018-03-04 | End: 2018-04-03

## 2018-02-01 RX ORDER — LISDEXAMFETAMINE DIMESYLATE 70 MG/1
70 CAPSULE ORAL DAILY
Qty: 30 CAPSULE | Refills: 0 | Status: SHIPPED | OUTPATIENT
Start: 2018-02-01 | End: 2018-03-03

## 2018-02-01 RX ORDER — CITALOPRAM HYDROBROMIDE 10 MG/1
10 TABLET ORAL DAILY
Qty: 90 TABLET | Refills: 1 | Status: SHIPPED | OUTPATIENT
Start: 2018-02-01 | End: 2018-08-10

## 2018-02-01 RX ORDER — LISDEXAMFETAMINE DIMESYLATE 70 MG/1
70 CAPSULE ORAL DAILY
Qty: 30 CAPSULE | Refills: 0 | Status: CANCELLED | OUTPATIENT
Start: 2018-02-01

## 2018-02-01 RX ORDER — LISDEXAMFETAMINE DIMESYLATE 70 MG/1
70 CAPSULE ORAL DAILY
Qty: 30 CAPSULE | Refills: 0 | Status: SHIPPED | OUTPATIENT
Start: 2018-04-04 | End: 2018-05-10

## 2018-02-01 ASSESSMENT — ANXIETY QUESTIONNAIRES
2. NOT BEING ABLE TO STOP OR CONTROL WORRYING: NOT AT ALL
5. BEING SO RESTLESS THAT IT IS HARD TO SIT STILL: SEVERAL DAYS
6. BECOMING EASILY ANNOYED OR IRRITABLE: NOT AT ALL
1. FEELING NERVOUS, ANXIOUS, OR ON EDGE: NOT AT ALL
7. FEELING AFRAID AS IF SOMETHING AWFUL MIGHT HAPPEN: NOT AT ALL
IF YOU CHECKED OFF ANY PROBLEMS ON THIS QUESTIONNAIRE, HOW DIFFICULT HAVE THESE PROBLEMS MADE IT FOR YOU TO DO YOUR WORK, TAKE CARE OF THINGS AT HOME, OR GET ALONG WITH OTHER PEOPLE: SOMEWHAT DIFFICULT
GAD7 TOTAL SCORE: 2
3. WORRYING TOO MUCH ABOUT DIFFERENT THINGS: NOT AT ALL

## 2018-02-01 ASSESSMENT — PATIENT HEALTH QUESTIONNAIRE - PHQ9: 5. POOR APPETITE OR OVEREATING: SEVERAL DAYS

## 2018-02-01 NOTE — TELEPHONE ENCOUNTER
Reason for call:  Med refill  Patient called regarding (reason for call): prescription-Vyvanse  Additional comments:Patient is wondering if it needs a prior auth?     Phone number to reach patient: [phone]  Best Time:anytime today or  tomorrow 1145am- 145pm    Can we leave a detailed message on this number?  YES

## 2018-02-01 NOTE — TELEPHONE ENCOUNTER
Patient reported to get a text message that this was delayed in processing. She reported to have not contacted her pharmacy yet. She will contact her pharmacy to find out more information and have the pharmacy contact the clinic if a prior authorization is actually needed. Ariadna Lambert,

## 2018-02-01 NOTE — TELEPHONE ENCOUNTER
Spoke to patient and patient to have her RX, she states that she need a prior auth for medication. Spoke to Dr Dumont and she is going to look sebastien it    Jose F Galeas. MA

## 2018-02-01 NOTE — MR AVS SNAPSHOT
"              After Visit Summary   2/1/2018    Sofie Padilla    MRN: [id number]           Patient Information     Date Of Birth          1995        Visit Information        Provider Department      2/1/2018 10:20 AM Eliazar Dumont MD [facility]        Today's Diagnoses     Oligomenorrhea, unspecified type    -  1    Attention deficit hyperactivity disorder (ADHD), combined type        Major depressive disorder, recurrent, in remission, unspecified (H)        Anxiety        Secondary amenorrhea           Follow-ups after your visit        Who to contact     If you have questions or need follow up information about today's clinic visit or your schedule please contact [facility] directly at [phone].  Normal or non-critical lab and imaging results will be communicated to you by MyChart, letter or phone within 4 business days after the clinic has received the results. If you do not hear from us within 7 days, please contact the clinic through MyChart or phone. If you have a critical or abnormal lab result, we will notify you by phone as soon as possible.  Submit refill requests through [employer] or call your pharmacy and they will forward the refill request to us. Please allow 3 business days for your refill to be completed.          Additional Information About Your Visit        MyChart Information     [employer] lets you send messages to your doctor, view your test results, renew your prescriptions, schedule appointments and more. To sign up, go to [email or web address] . Click on \"Log in\" on the left side of the screen, which will take you to the Welcome page. Then click on \"Sign up Now\" on the right side of the page.     You will be asked to enter the access code listed below, as well as some personal information. Please follow the directions to create your username and password.     Your access code is: [email or web address]  Expires: 5/2/2018 11:48 AM     Your access " "code will  in 90 days. If you need help or a new code, please call your [place] clinic or [phone].        Care EveryWhere ID     This is your Care EveryWhere ID. This could be used by other organizations to access your [place] medical records  [id number]        Your Vitals Were     Pulse Temperature Height Pulse Oximetry BMI (Body Mass Index)       81 97.2  F (36.2  C) (Oral) 5' 3.39\" (1.61 m) 97% 23.03 kg/m2        Blood Pressure from Last 3 Encounters:   18 110/62   17 127/72   17 131/76    Weight from Last 3 Encounters:   18 131 lb 9.6 oz (59.7 kg)   17 135 lb (61.2 kg) (62 %)*   17 136 lb (61.7 kg) (64 %)*     * Growth percentiles are based on [facility] 2-20 Years data.              Today, you had the following     No orders found for display         Today's Medication Changes          These changes are accurate as of 18 11:48 AM.  If you have any questions, ask your nurse or doctor.               Start taking these medicines.        Dose/Directions    * lisdexamfetamine 70 MG capsule   Commonly known as:  VYVANSE   Used for:  Attention deficit hyperactivity disorder (ADHD), combined type   Started by:  Eliazar Dumont MD        Dose:  70 mg   Take 1 capsule (70 mg) by mouth daily   Quantity:  30 capsule   Refills:  0       * lisdexamfetamine 70 MG capsule   Commonly known as:  VYVANSE   Used for:  Attention deficit hyperactivity disorder (ADHD), combined type   Started by:  Eliazar Dumont MD        Dose:  70 mg   Start taking on:  3/4/2018   Take 1 capsule (70 mg) by mouth daily   Quantity:  30 capsule   Refills:  0       * lisdexamfetamine 70 MG capsule   Commonly known as:  VYVANSE   Used for:  Attention deficit hyperactivity disorder (ADHD), combined type   Started by:  Eliazar Dumont MD        Dose:  70 mg   Start taking on:  2018   Take 1 capsule (70 mg) by mouth daily   Quantity:  30 capsule   Refills:  0 "       medroxyPROGESTERone 10 MG tablet   Commonly known as:  PROVERA   Used for:  Secondary amenorrhea   Started by:  Eliazar Dumont MD        Dose:  10 mg   Take 1 tablet (10 mg) by mouth daily for 10 days   Quantity:  10 tablet   Refills:  0       * Notice:  This list has 3 medication(s) that are the same as other medications prescribed for you. Read the directions carefully, and ask your doctor or other care provider to review them with you.         Where to get your medicines      These medications were sent to [facility] [address] [place] [address] [address] AT [facility]  [address], [facility] [address]     Phone:  [phone]     citalopram 10 MG tablet    medroxyPROGESTERone 10 MG tablet         Some of these will need a paper prescription and others can be bought over the counter.  Ask your nurse if you have questions.     Bring a paper prescription for each of these medications     lisdexamfetamine 70 MG capsule    lisdexamfetamine 70 MG capsule    lisdexamfetamine 70 MG capsule                Primary Care Provider Office Phone # Fax #    Eliazar Dumont [phone] [address] [facility] [address]        Equal Access to Services     TOSHA ARMENTA AH: Hadii aad ku hadasho Soomaali, waaxda luqadaha, qaybta kaalmada adeegyada, waxay idiin hayaan rebecca khthelma munguia. So [facility] [phone].    ATENCIÓN: Si habla español, tiene a noyola disposición servicios gratuitos de asistencia lingüística. Jaydename al [phone].    We comply with applicable federal civil rights laws and [place] laws. We do not discriminate on the basis of race, color, national origin, age, disability, sex, sexual orientation, or gender identity.            Thank you!     Thank you for choosing [facility]  for your care. Our goal is always to provide you with excellent care. Hearing back from our patients is one  way we can continue to improve our services. Please take a few minutes to complete the written survey that you may receive in the mail after your visit with us. Thank you!             Your Updated Medication List - Protect others around you: Learn how to safely use, store and throw away your medicines at www.disposemymeds.org.          This list is accurate as of 2/1/18 11:48 AM.  Always use your most recent med list.                   Brand Name Dispense Instructions for use Diagnosis    citalopram 10 MG tablet    celeXA    90 tablet    Take 1 tablet (10 mg) by mouth daily    Major depressive disorder, recurrent, in remission, unspecified (H), Anxiety       * lisdexamfetamine 70 MG capsule    VYVANSE    30 capsule    Take 1 capsule (70 mg) by mouth daily    Attention deficit hyperactivity disorder (ADHD), combined type       * lisdexamfetamine 70 MG capsule   Start taking on:  3/4/2018    VYVANSE    30 capsule    Take 1 capsule (70 mg) by mouth daily    Attention deficit hyperactivity disorder (ADHD), combined type       * lisdexamfetamine 70 MG capsule   Start taking on:  4/4/2018    VYVANSE    30 capsule    Take 1 capsule (70 mg) by mouth daily    Attention deficit hyperactivity disorder (ADHD), combined type       medroxyPROGESTERone 10 MG tablet    PROVERA    10 tablet    Take 1 tablet (10 mg) by mouth daily for 10 days    Secondary amenorrhea       mupirocin 2 % nasal ointment    BACTROBAN NASAL    22 g    Apply to affected area 3 times/day for 5-7 days    Impetigo       * Notice:  This list has 3 medication(s) that are the same as other medications prescribed for you. Read the directions carefully, and ask your doctor or other care provider to review them with you.

## 2018-02-01 NOTE — TELEPHONE ENCOUNTER
Appears that patient still hasn't called back (to schedule nor to request refill again). Will not approve refill at this time.    Dr. Suki Dumont

## 2018-02-01 NOTE — PROGRESS NOTES
"  SUBJECTIVE:   Sofie Padilla is a 22 year old female who presents to clinic today for the following health issues:      Chief Complaint   Patient presents with     A.D.H.D     Depression     Abnormal Bleeding Problem     Irregular periods     Doing well on her citalopram. She thinks it's working well and wants to continue it.  Still takes the Vyvanse, no side effects, still effective. Doesn't want to change dose.    Quit job at [employer], just didn't like it anymore.  Now working at [employer], and loves it.  Moved out of parents house, has apartment with a friend.    Menarche around age 15. Early on, would have period fairly regular, but less frequent, about every couple months. Were pretty light (1 day of real flow, then spotting), rarely got cramps, and not bad.  Not sure when last period was, maybe 6 months or more. Not sexually active - has never had sex.    Reportedly, Sofie's mom had similar issue when young, \"got a shot\", then got her period and regulated out after that.    Problem list and histories reviewed & adjusted, as indicated.  Additional history: as documented    Patient Active Problem List   Diagnosis     Mild major depression (H)     Allergic rhinitis     Adjustment disorder with disturbance of conduct     ADHD (attention deficit hyperactivity disorder)     Keratosis pilaris     Verruca plantaris     History of varicella as a child     Anxiety     Major depressive disorder, recurrent episode, moderate (H)     Past Surgical History:   Procedure Laterality Date     PE TUBES  age 3     TYMPANOPLASTY  3/19/2013    Procedure: TYMPANOPLASTY;  Right fat graft tympanoplasty;  Surgeon: Jersey Ricardo MD;  Location:  OR       Social History   Substance Use Topics     Smoking status: Never Smoker     Smokeless tobacco: Never Used      Comment: non-smoking household     Alcohol use No     Family History   Problem Relation Age of Onset     C.A.D. Father      DIABETES Father      " "Pre-diabetes     Arthritis Maternal Grandmother      Cardiovascular Maternal Grandmother      heart disease     Alzheimer Disease Paternal Grandfather      Hypertension Paternal Grandfather      Asthma Brother      Depression Mother      Depression Maternal Grandfather      HEART DISEASE Maternal Aunt      enlarged heart     HEART DISEASE Maternal Aunt      enlarged heart     CANCER Maternal Aunt      HEART DISEASE Maternal Uncle      enlarged heart     GASTROINTESTINAL DISEASE Maternal Uncle      colitis     CEREBROVASCULAR DISEASE No family hx of      Thyroid Disease No family hx of      Glaucoma No family hx of      Macular Degeneration No family hx of          Current Outpatient Prescriptions   Medication Sig Dispense Refill     citalopram (CELEXA) 10 MG tablet Take 1 tablet (10 mg) by mouth daily 90 tablet 1     lisdexamfetamine (VYVANSE) 70 MG capsule Take 1 capsule (70 mg) by mouth daily 30 capsule 0     [START ON 3/4/2018] lisdexamfetamine (VYVANSE) 70 MG capsule Take 1 capsule (70 mg) by mouth daily 30 capsule 0     [START ON 4/4/2018] lisdexamfetamine (VYVANSE) 70 MG capsule Take 1 capsule (70 mg) by mouth daily 30 capsule 0     medroxyPROGESTERone (PROVERA) 10 MG tablet Take 1 tablet (10 mg) by mouth daily for 10 days 10 tablet 0     mupirocin (BACTROBAN NASAL) 2 % nasal ointment Apply to affected area 3 times/day for 5-7 days 22 g 0     Allergies   Allergen Reactions     Nkda [No Known Drug Allergies]        Reviewed and updated as needed this visit by clinical staff  Tobacco  Allergies  Meds  Med Hx  Surg Hx  Fam Hx  Soc Hx      Reviewed and updated as needed this visit by Provider         ROS:  Constitutional, HEENT, cardiovascular, pulmonary, gi and gu systems are negative, except as otherwise noted.    OBJECTIVE:     /62  Pulse 81  Temp 97.2  F (36.2  C) (Oral)  Ht 5' 3.39\" (1.61 m)  Wt 131 lb 9.6 oz (59.7 kg)  SpO2 97%  BMI 23.03 kg/m2  Body mass index is 23.03 kg/(m^2).  GENERAL: " healthy, alert and no distress  SKIN: not hirsute, no significant acne  NECK: no adenopathy, no asymmetry, masses, or scars and thyroid normal to palpation  RESP: lungs clear to auscultation - no rales, rhonchi or wheezes  CV: regular rate and rhythm, normal S1 S2, no S3 or S4, no murmur, click or rub  PSYCH: mentation appears normal, affect normal/bright    PHQ-9 SCORE 6/28/2017 10/4/2017 2/1/2018   Total Score - - -   Total Score 2 2 2     SALOMÓN-7 SCORE 6/28/2017 10/4/2017 2/1/2018   Total Score - - -   Total Score 2 3 2         ASSESSMENT/PLAN:     Depression; recurrent episode-- Full remission   Associated with the following complications:    None   Plan:  No changes in the patient's current treatment plan    (F90.2) Attention deficit hyperactivity disorder (ADHD), combined type  (primary encounter diagnosis)  Comment: stable, doing well  Plan: lisdexamfetamine (VYVANSE) 70 MG capsule,         lisdexamfetamine (VYVANSE) 70 MG capsule,         lisdexamfetamine (VYVANSE) 70 MG capsule        Follow up in 6 months    (F33.40) Major depressive disorder, recurrent, in remission, unspecified (H)  (F41.9) Anxiety  Comment: stable, doing well on current dose  Plan: citalopram (CELEXA) 10 MG tablet        Follow up in 6 months    (N91.5) Oligomenorrhea, unspecified type  (N91.1) Secondary amenorrhea  Comment: has always had infrequent periods, but less so now. No clinical signs suggestive of PCOS (no acne, hirsutism, obesity)  Plan: medroxyPROGESTERone (PROVERA) 10 MG tablet        Discussed options. Will start with progesterone challenge. After 10 days of Provera, would expect to have period within 1-2 weeks of stopping (most likely earlier). If does get period, Sofie would like to wait and see if has more regular periods after that. If gets period, but does not continue to have regular periods, she will likely start oral contraceptive pills to regulate her periods. If she does not get her period as expected following the  challenge, will do lab work to further evaluate the cause.      MEDICATIONS:  Continue current medications without change  Follow up in 6 months for medication check.    Follow up as needed regarding periods depending on results of progesterone challenge.    Eliazar Dumont MD  [facility]

## 2018-02-02 ASSESSMENT — PATIENT HEALTH QUESTIONNAIRE - PHQ9: SUM OF ALL RESPONSES TO PHQ QUESTIONS 1-9: 2

## 2018-02-02 ASSESSMENT — ANXIETY QUESTIONNAIRES: GAD7 TOTAL SCORE: 2

## 2018-02-08 ENCOUNTER — TELEPHONE (OUTPATIENT)
Dept: PEDIATRICS | Facility: CLINIC | Age: 23
End: 2018-02-08

## 2018-02-08 DIAGNOSIS — N91.1 SECONDARY AMENORRHEA: ICD-10-CM

## 2018-02-08 RX ORDER — MEDROXYPROGESTERONE ACETATE 10 MG
TABLET ORAL
Qty: 10 TABLET | Refills: 0
Start: 2018-02-08

## 2018-02-08 NOTE — TELEPHONE ENCOUNTER
Routing refill request to provider for review/approval because:  Drug not on the [facility] refill protocol       Requested Prescriptions   Pending Prescriptions Disp Refills     medroxyPROGESTERone (PROVERA) 10 MG tablet [Pharmacy Med Name: MEDROXYPROGESTERONE 10MG TABLETS] 10 tablet 0     Sig: TAKE 1 TABLET(10 MG) BY MOUTH DAILY FOR 10 DAYS    There is no refill protocol information for this order        Ángela Desouza RN - BC

## 2018-02-12 NOTE — TELEPHONE ENCOUNTER
Attempt 2, Called patient and left VM to call red team.  Urvashi SHORT CMA ([facility])

## 2018-05-08 RX ORDER — LISDEXAMFETAMINE DIMESYLATE 70 MG/1
70 CAPSULE ORAL DAILY
Qty: 30 CAPSULE | Refills: 0 | Status: CANCELLED | OUTPATIENT
Start: 2018-05-08

## 2018-05-08 NOTE — PATIENT INSTRUCTIONS
[facility]    If you have any questions regarding to your visit please contact your care team:       Team Red:   Clinic Hours Telephone Number   Dr. Graciela Dumont  (pediatrics)  Abbey De Leon NP 7am-7pm  Monday - Thursday   7am-5pm  Fridays  [phone] (fax)    Cathleen OSBORN  [phone]   Urgent Care - [place] and [place] Monday-Friday  [place] - 11am-8pm  Saturday-Sunday  Both sites - 9am-5pm  [phone] - [facility]  [phone] - [place]       What options do I have for visits at the clinic other than the traditional office visit?  To expand how we care for you, many of our providers are utilizing electronic visits (e-visits) and telephone visits, when medically appropriate, for interactions with their patients rather than a visit in the clinic.   We also offer nurse visits for many medical concerns. Just like any other service, we will bill your insurance company for this type of visit based on time spent on the phone with your provider. Not all insurance companies cover these visits. Please check with your medical insurance if this type of visit is covered. You will be responsible for any charges that are not paid by your insurance.      E-visits via [employer]:  generally incur a $35.00 fee.  Telephone visits:  Time spent on the phone: *charged based on time that is spent on the phone in increments of 10 minutes. Estimated cost:   5-10 mins $30.00   11-20 mins. $59.00   21-30 mins. $85.00     As always, Thank you for trusting us with your health care needs!            Discharged by Nicole Martinez MA.

## 2018-05-08 NOTE — PROGRESS NOTES
SUBJECTIVE:   Sofie Padilla is a 22 year old female who presents to clinic today for the following health issues:      Medication Followup of Vyvanse    Taking Medication as prescribed: yes    Side Effects:  None    Medication Helping Symptoms:  yes       Problem list and histories reviewed & adjusted, as indicated.  Additional history: as documented    Patient Active Problem List   Diagnosis     Mild major depression (H)     Allergic rhinitis     Adjustment disorder with disturbance of conduct     ADHD (attention deficit hyperactivity disorder)     Keratosis pilaris     Verruca plantaris     History of varicella as a child     Anxiety     Major depressive disorder, recurrent episode, moderate (H)     Past Surgical History:   Procedure Laterality Date     PE TUBES  age 3     TYMPANOPLASTY  3/19/2013    Procedure: TYMPANOPLASTY;  Right fat graft tympanoplasty;  Surgeon: Jersey Ricardo MD;  Location:  OR       Social History   Substance Use Topics     Smoking status: Never Smoker     Smokeless tobacco: Never Used      Comment: non-smoking household     Alcohol use No     Family History   Problem Relation Age of Onset     C.A.D. Father      DIABETES Father      Pre-diabetes     Arthritis Maternal Grandmother      Cardiovascular Maternal Grandmother      heart disease     Alzheimer Disease Paternal Grandfather      Hypertension Paternal Grandfather      Asthma Brother      Depression Mother      Depression Maternal Grandfather      HEART DISEASE Maternal Aunt      enlarged heart     HEART DISEASE Maternal Aunt      enlarged heart     CANCER Maternal Aunt      HEART DISEASE Maternal Uncle      enlarged heart     GASTROINTESTINAL DISEASE Maternal Uncle      colitis     CEREBROVASCULAR DISEASE No family hx of      Thyroid Disease No family hx of      Glaucoma No family hx of      Macular Degeneration No family hx of          Current Outpatient Prescriptions   Medication Sig Dispense Refill     citalopram  "(CELEXA) 10 MG tablet Take 1 tablet (10 mg) by mouth daily 90 tablet 1     mupirocin (BACTROBAN NASAL) 2 % nasal ointment Apply to affected area 3 times/day for 5-7 days 22 g 0     lisdexamfetamine (VYVANSE) 70 MG capsule Take 1 capsule (70 mg) by mouth daily 30 capsule 0     [START ON 6/10/2018] lisdexamfetamine (VYVANSE) 70 MG capsule Take 1 capsule (70 mg) by mouth daily 30 capsule 0     [START ON 7/11/2018] lisdexamfetamine (VYVANSE) 70 MG capsule Take 1 capsule (70 mg) by mouth daily 30 capsule 0     Allergies   Allergen Reactions     Nkda [No Known Drug Allergies]        Reviewed and updated as needed this visit by clinical staff       Reviewed and updated as needed this visit by Provider         ROS:  Constitutional, HEENT, cardiovascular, pulmonary, gi and gu systems are negative, except as otherwise noted.    OBJECTIVE:     /60 (BP Location: Left arm, Patient Position: Chair, Cuff Size: Adult Regular)  Pulse 79  Temp 97.3  F (36.3  C) (Oral)  Ht 5' 3.5\" (1.613 m)  Wt 139 lb (63 kg)  SpO2 98%  BMI 24.24 kg/m2  Body mass index is 24.24 kg/(m^2).  GENERAL: healthy, alert and no distress  RESP: lungs clear to auscultation - no rales, rhonchi or wheezes  CV: regular rate and rhythm, normal S1 S2, no S3 or S4, no murmur, click or rub, no peripheral edema and peripheral pulses strong  NEURO: Normal strength and tone, mentation intact and speech normal  PSYCH: mentation appears normal, affect normal/bright    Diagnostic Test Results:  none     ASSESSMENT/PLAN:         (F90.2) Attention deficit hyperactivity disorder (ADHD), combined type  Comment: doing well on current dose  Plan: lisdexamfetamine (VYVANSE) 70 MG capsule,         lisdexamfetamine (VYVANSE) 70 MG capsule,         lisdexamfetamine (VYVANSE) 70 MG capsule  3 prescriptions written.  She may be moving to [place] the summer.  If she does, she is not sure for how long.  It may just be the summer, or may be more permanent.  She has enough " prescriptions to get her through the summer, but if it does become a more permanent arrangement, can refill for up to a total of 6 months while she establishes care closer to her new home.      FUTURE APPOINTMENTS:       - Follow-up office or E-visit in 6 months    Eliazar Dumont MD  [facility]

## 2018-05-10 ENCOUNTER — OFFICE VISIT (OUTPATIENT)
Dept: PEDIATRICS | Facility: CLINIC | Age: 23
End: 2018-05-10
Payer: COMMERCIAL

## 2018-05-10 VITALS
DIASTOLIC BLOOD PRESSURE: 60 MMHG | OXYGEN SATURATION: 98 % | TEMPERATURE: 97.3 F | HEIGHT: 64 IN | SYSTOLIC BLOOD PRESSURE: 118 MMHG | HEART RATE: 79 BPM | BODY MASS INDEX: 23.73 KG/M2 | WEIGHT: 139 LBS

## 2018-05-10 DIAGNOSIS — F90.2 ATTENTION DEFICIT HYPERACTIVITY DISORDER (ADHD), COMBINED TYPE: ICD-10-CM

## 2018-05-10 PROCEDURE — 99213 OFFICE O/P EST LOW 20 MIN: CPT | Performed by: PEDIATRICS

## 2018-05-10 RX ORDER — LISDEXAMFETAMINE DIMESYLATE 70 MG/1
70 CAPSULE ORAL DAILY
Qty: 30 CAPSULE | Refills: 0 | Status: SHIPPED | OUTPATIENT
Start: 2018-07-11 | End: 2019-07-29

## 2018-05-10 RX ORDER — LISDEXAMFETAMINE DIMESYLATE 70 MG/1
70 CAPSULE ORAL DAILY
Qty: 30 CAPSULE | Refills: 0 | Status: SHIPPED | OUTPATIENT
Start: 2018-06-10 | End: 2018-07-10

## 2018-05-10 RX ORDER — LISDEXAMFETAMINE DIMESYLATE 70 MG/1
70 CAPSULE ORAL DAILY
Qty: 30 CAPSULE | Refills: 0 | Status: SHIPPED | OUTPATIENT
Start: 2018-05-10 | End: 2018-06-09

## 2018-05-10 ASSESSMENT — PAIN SCALES - GENERAL: PAINLEVEL: NO PAIN (0)

## 2018-05-10 NOTE — MR AVS SNAPSHOT
After Visit Summary   5/10/2018    Sofie Padilla    MRN: [id number]           Patient Information     Date Of Birth          1995        Visit Information        Provider Department      5/10/2018 11:20 AM Eliazar Dumont MD [facility]        Today's Diagnoses     Attention deficit hyperactivity disorder (ADHD), combined type          Care Instructions    [facility]    If you have any questions regarding to your visit please contact your care team:       Team Red:   Clinic Hours Telephone Number   Dr. Graciela Dumont  (pediatrics)  Abbey De Leon NP 7am-7pm  Monday - Thursday   7am-5pm  Fridays  [phone] (fax)    Cathleen OSBORN  [phone]   Urgent Care - [place] and [place] Monday-Friday  [place] - 11am-8pm  Saturday-Sunday  Both sites - 9am-5pm  [phone] - [facility]  [phone] - [place]       What options do I have for visits at the clinic other than the traditional office visit?  To expand how we care for you, many of our providers are utilizing electronic visits (e-visits) and telephone visits, when medically appropriate, for interactions with their patients rather than a visit in the clinic.   We also offer nurse visits for many medical concerns. Just like any other service, we will bill your insurance company for this type of visit based on time spent on the phone with your provider. Not all insurance companies cover these visits. Please check with your medical insurance if this type of visit is covered. You will be responsible for any charges that are not paid by your insurance.      E-visits via [employer]:  generally incur a $35.00 fee.  Telephone visits:  Time spent on the phone: *charged based on time that is spent on the phone in increments of 10 minutes. Estimated cost:   5-10 mins $30.00   11-20 mins. $59.00   21-30 mins. $85.00     As always, Thank you for trusting  "us with your health care needs!            Discharged by Nicole Martinez MA.            Follow-ups after your visit        Who to contact     If you have questions or need follow up information about today's clinic visit or your schedule please contact [facility] [place] directly at [phone].  Normal or non-critical lab and imaging results will be communicated to you by MyChart, letter or phone within 4 business days after the clinic has received the results. If you do not hear from us within 7 days, please contact the clinic through MyChart or phone. If you have a critical or abnormal lab result, we will notify you by phone as soon as possible.  Submit refill requests through [employer] or call your pharmacy and they will forward the refill request to us. Please allow 3 business days for your refill to be completed.          Additional Information About Your Visit        MyChart Information     [employer] lets you send messages to your doctor, view your test results, renew your prescriptions, schedule appointments and more. To sign up, go to [email or web address] . Click on \"Log in\" on the left side of the screen, which will take you to the Welcome page. Then click on \"Sign up Now\" on the right side of the page.     You will be asked to enter the access code listed below, as well as some personal information. Please follow the directions to create your username and password.     Your access code is: [email or web address]  Expires: 2018 12:28 PM     Your access code will  in 90 days. If you need help or a new code, please call your [place] clinic or [phone].        Care EveryWhere ID     This is your Care EveryWhere ID. This could be used by other organizations to access your [place] medical records  [id number]        Your Vitals Were     Pulse Temperature Height Pulse Oximetry BMI (Body Mass Index)       79 97.3  F (36.3  C) (Oral) 5' 3.5\" (1.613 m) 98% 24.24 kg/m2        Blood Pressure from Last 3 " Encounters:   05/10/18 118/60   02/01/18 110/62   11/01/17 127/72    Weight from Last 3 Encounters:   05/10/18 139 lb (63 kg)   02/01/18 131 lb 9.6 oz (59.7 kg)   11/01/17 135 lb (61.2 kg) (62 %)*     * Growth percentiles are based on [facility] 2-20 Years data.              Today, you had the following     No orders found for display         Today's Medication Changes          These changes are accurate as of 5/10/18 12:28 PM.  If you have any questions, ask your nurse or doctor.               Start taking these medicines.        Dose/Directions    * lisdexamfetamine 70 MG capsule   Commonly known as:  VYVANSE   Used for:  Attention deficit hyperactivity disorder (ADHD), combined type   Started by:  Eliazar Dumont MD        Dose:  70 mg   Take 1 capsule (70 mg) by mouth daily   Quantity:  30 capsule   Refills:  0       * lisdexamfetamine 70 MG capsule   Commonly known as:  VYVANSE   Used for:  Attention deficit hyperactivity disorder (ADHD), combined type   Started by:  Eliazar Dumont MD        Dose:  70 mg   Start taking on:  6/10/2018   Take 1 capsule (70 mg) by mouth daily   Quantity:  30 capsule   Refills:  0       * lisdexamfetamine 70 MG capsule   Commonly known as:  VYVANSE   Used for:  Attention deficit hyperactivity disorder (ADHD), combined type   Started by:  Eliazar Dumont MD        Dose:  70 mg   Start taking on:  7/11/2018   Take 1 capsule (70 mg) by mouth daily   Quantity:  30 capsule   Refills:  0       * Notice:  This list has 3 medication(s) that are the same as other medications prescribed for you. Read the directions carefully, and ask your doctor or other care provider to review them with you.         Where to get your medicines      Some of these will need a paper prescription and others can be bought over the counter.  Ask your nurse if you have questions.     Bring a paper prescription for each of these medications     lisdexamfetamine 70 MG capsule     lisdexamfetamine 70 MG capsule    lisdexamfetamine 70 MG capsule                Primary Care Provider Office Phone # Fax #    Eliazar Maribel Dumont [phone] [address]  [place] [address]        Equal Access to Services     [facility]: Hadii aad ku hadasho Soomaali, waaxda luqadaha, qaybta kaalmada adeegyada, waxay idiin hayaan adeeg khnilsonsh la'aan ah. So [facility] [phone].    ATENCIÓN: Si habla español, tiene a noyola disposición servicios gratuitos de asistencia lingüística. [facility] [phone].    We comply with applicable federal civil rights laws and [place] laws. We do not discriminate on the basis of race, color, national origin, age, disability, sex, sexual orientation, or gender identity.            Thank you!     Thank you for choosing [facility]  for your care. Our goal is always to provide you with excellent care. Hearing back from our patients is one way we can continue to improve our services. Please take a few minutes to complete the written survey that you may receive in the mail after your visit with us. Thank you!             Your Updated Medication List - Protect others around you: Learn how to safely use, store and throw away your medicines at www.disposemymeds.org.          This list is accurate as of 5/10/18 12:28 PM.  Always use your most recent med list.                   Brand Name Dispense Instructions for use Diagnosis    citalopram 10 MG tablet    celeXA    90 tablet    Take 1 tablet (10 mg) by mouth daily    Major depressive disorder, recurrent, in remission, unspecified (H), Anxiety       * lisdexamfetamine 70 MG capsule    VYVANSE    30 capsule    Take 1 capsule (70 mg) by mouth daily    Attention deficit hyperactivity disorder (ADHD), combined type       * lisdexamfetamine 70 MG capsule   Start taking on:  6/10/2018    VYVANSE    30 capsule    Take 1 capsule (70 mg) by mouth daily    Attention deficit hyperactivity  disorder (ADHD), combined type       * lisdexamfetamine 70 MG capsule   Start taking on:  7/11/2018    VYVANSE    30 capsule    Take 1 capsule (70 mg) by mouth daily    Attention deficit hyperactivity disorder (ADHD), combined type       mupirocin 2 % nasal ointment    BACTROBAN NASAL    22 g    Apply to affected area 3 times/day for 5-7 days    Impetigo       * Notice:  This list has 3 medication(s) that are the same as other medications prescribed for you. Read the directions carefully, and ask your doctor or other care provider to review them with you.

## 2018-06-22 ENCOUNTER — OFFICE VISIT (OUTPATIENT)
Dept: PEDIATRICS | Facility: CLINIC | Age: 23
End: 2018-06-22
Payer: COMMERCIAL

## 2018-06-22 VITALS
HEIGHT: 64 IN | WEIGHT: 136 LBS | TEMPERATURE: 97.9 F | RESPIRATION RATE: 16 BRPM | SYSTOLIC BLOOD PRESSURE: 118 MMHG | HEART RATE: 104 BPM | BODY MASS INDEX: 23.22 KG/M2 | DIASTOLIC BLOOD PRESSURE: 75 MMHG

## 2018-06-22 DIAGNOSIS — F41.9 ANXIETY: ICD-10-CM

## 2018-06-22 DIAGNOSIS — F90.2 ATTENTION DEFICIT HYPERACTIVITY DISORDER (ADHD), COMBINED TYPE: ICD-10-CM

## 2018-06-22 DIAGNOSIS — R51.9 NONINTRACTABLE EPISODIC HEADACHE, UNSPECIFIED HEADACHE TYPE: Primary | ICD-10-CM

## 2018-06-22 DIAGNOSIS — J06.9 UPPER RESPIRATORY TRACT INFECTION, UNSPECIFIED TYPE: ICD-10-CM

## 2018-06-22 DIAGNOSIS — F33.40 MAJOR DEPRESSIVE DISORDER, RECURRENT, IN REMISSION, UNSPECIFIED (H): ICD-10-CM

## 2018-06-22 LAB
BASOPHILS # BLD AUTO: 0 10E9/L (ref 0–0.2)
BASOPHILS NFR BLD AUTO: 0.2 %
DIFFERENTIAL METHOD BLD: NORMAL
EOSINOPHIL # BLD AUTO: 0.1 10E9/L (ref 0–0.7)
EOSINOPHIL NFR BLD AUTO: 0.7 %
ERYTHROCYTE [DISTWIDTH] IN BLOOD BY AUTOMATED COUNT: 11.8 % (ref 10–15)
HCT VFR BLD AUTO: 41.9 % (ref 35–47)
HGB BLD-MCNC: 14.3 G/DL (ref 11.7–15.7)
LYMPHOCYTES # BLD AUTO: 1.5 10E9/L (ref 0.8–5.3)
LYMPHOCYTES NFR BLD AUTO: 18 %
MCH RBC QN AUTO: 30.8 PG (ref 26.5–33)
MCHC RBC AUTO-ENTMCNC: 34.1 G/DL (ref 31.5–36.5)
MCV RBC AUTO: 90 FL (ref 78–100)
MONOCYTES # BLD AUTO: 0.8 10E9/L (ref 0–1.3)
MONOCYTES NFR BLD AUTO: 9.1 %
NEUTROPHILS # BLD AUTO: 6 10E9/L (ref 1.6–8.3)
NEUTROPHILS NFR BLD AUTO: 72 %
PLATELET # BLD AUTO: 195 10E9/L (ref 150–450)
RBC # BLD AUTO: 4.65 10E12/L (ref 3.8–5.2)
WBC # BLD AUTO: 8.3 10E9/L (ref 4–11)

## 2018-06-22 PROCEDURE — 85025 COMPLETE CBC W/AUTO DIFF WBC: CPT | Performed by: PEDIATRICS

## 2018-06-22 PROCEDURE — 36415 COLL VENOUS BLD VENIPUNCTURE: CPT | Performed by: PEDIATRICS

## 2018-06-22 PROCEDURE — 99214 OFFICE O/P EST MOD 30 MIN: CPT | Performed by: PEDIATRICS

## 2018-06-22 RX ORDER — LISDEXAMFETAMINE DIMESYLATE 70 MG/1
70 CAPSULE ORAL DAILY
Qty: 30 CAPSULE | Refills: 0 | Status: CANCELLED | OUTPATIENT
Start: 2018-06-22

## 2018-06-22 RX ORDER — CITALOPRAM HYDROBROMIDE 10 MG/1
10 TABLET ORAL DAILY
Qty: 90 TABLET | Refills: 1 | Status: CANCELLED | OUTPATIENT
Start: 2018-06-22

## 2018-06-22 NOTE — PATIENT INSTRUCTIONS
[facility]    If you have any questions regarding to your visit please contact your care team:       Team Red:   Clinic Hours Telephone Number   Dr. Graciela De Leon, NP   7am-7pm  Monday - Thursday   7am-5pm  Fridays  [phone]  (Appointment scheduling available 24/7)    Questions about your recent visit?   Team Line  [phone]   Urgent Care - [place] and [facility] - 11am-9pm Monday-Friday Saturday-Sunday- 9am-5pm   [place] - 5pm-9pm Monday-Friday Saturday-Sunday- 9am-5pm  [phone] - [place]  [phone] - [place]       What options do I have for a visit other than an office visit? We offer electronic visits (e-visits) and telephone visits, when medically appropriate.  Please check with your medical insurance to see if these types of visits are covered, as you will be responsible for any charges that are not paid by your insurance.      You can use [email or web address] (secure electronic communication) to access to your chart, send your primary care provider a message, or make an appointment. Ask a team member how to get started.     For a price quote for your services, please call our Consumer Price Line at [phone] or our Imaging Cost estimation line at [phone] (for imaging tests).

## 2018-06-22 NOTE — MR AVS SNAPSHOT
After Visit Summary   6/22/2018    Sofie Padilla    MRN: [id number]           Patient Information     Date Of Birth          1995        Visit Information        Provider Department      6/22/2018 3:00 PM Eliazar Dumont MD [facility]        Today's Diagnoses     Nonintractable episodic headache, unspecified headache type    -  1    Major depressive disorder, recurrent, in remission, unspecified (H)        Anxiety        Attention deficit hyperactivity disorder (ADHD), combined type        Upper respiratory tract infection, unspecified type          Care Instructions      [facility]    If you have any questions regarding to your visit please contact your care team:       Team Red:   Clinic Hours Telephone Number   Dr. Graciela De Leon NP   7am-7pm  Monday - Thursday   7am-5pm  Fridays  [phone]  (Appointment scheduling available 24/7)    Questions about your recent visit?   Team Line  [phone]   Urgent Care - [place] and [facility] - 11am-9pm Monday-Friday Saturday-Sunday- 9am-5pm   [place] - 5pm-9pm Monday-Friday Saturday-Sunday- 9am-5pm  [phone] - [place]  [phone] - [place]       What options do I have for a visit other than an office visit? We offer electronic visits (e-visits) and telephone visits, when medically appropriate.  Please check with your medical insurance to see if these types of visits are covered, as you will be responsible for any charges that are not paid by your insurance.      You can use Tweekaboo (secure electronic communication) to access to your chart, send your primary care provider a message, or make an appointment. Ask a team member how to get started.     For a price quote for your services, please call our Consumer Price Line at [phone] or our Imaging Cost estimation line at [phone] (for imaging tests).             "Follow-ups after your visit        Who to contact     If you have questions or need follow up information about today's clinic visit or your schedule please contact [facility] directly at [phone].  Normal or non-critical lab and imaging results will be communicated to you by MyChart, letter or phone within 4 business days after the clinic has received the results. If you do not hear from us within 7 days, please contact the clinic through MyChart or phone. If you have a critical or abnormal lab result, we will notify you by phone as soon as possible.  Submit refill requests through ProZyme or call your pharmacy and they will forward the refill request to us. Please allow 3 business days for your refill to be completed.          Additional Information About Your Visit        ProZyme Information     ProZyme lets you send messages to your doctor, view your test results, renew your prescriptions, schedule appointments and more. To sign up, go to [email or web address] . Click on \"Log in\" on the left side of the screen, which will take you to the Welcome page. Then click on \"Sign up Now\" on the right side of the page.     You will be asked to enter the access code listed below, as well as some personal information. Please follow the directions to create your username and password.     Your access code is: [email or web address]  Expires: 2018  3:06 PM     Your access code will  in 90 days. If you need help or a new code, please call your [place] clinic or [phone].        Care EveryWhere ID     This is your Care EveryWhere ID. This could be used by other organizations to access your [place] medical records  [id number]        Your Vitals Were     Pulse Temperature Respirations Height BMI (Body Mass Index)       104 97.9  F (36.6  C) 16 5' 3.5\" (1.613 m) 23.71 kg/m2        Blood Pressure from Last 3 Encounters:   18 118/75   05/10/18 118/60   18 110/62    Weight from Last 3 " Encounters:   06/22/18 136 lb (61.7 kg)   05/10/18 139 lb (63 kg)   02/01/18 131 lb 9.6 oz (59.7 kg)              We Performed the Following     CBC with platelets and differential        Primary Care Provider Office Phone # Fax #    Eliazar Maribel Dumont [phone]       [address] [facility] [address]        Equal Access to Services     [facility]: Hadii aad ku hadasho Soomaali, waaxda luqadaha, qaybta kaalmada adeegyada, waxay idiin hayaan adeeg kharash la'aan . So [facility] [phone].    ATENCIÓN: Si stephanyla espgracie, tiene a noyola disposición servicios gratuitos de asistencia lingüística. [facility] [phone].    We comply with applicable federal civil rights laws and [place] laws. We do not discriminate on the basis of race, color, national origin, age, disability, sex, sexual orientation, or gender identity.            Thank you!     Thank you for choosing [facility]  for your care. Our goal is always to provide you with excellent care. Hearing back from our patients is one way we can continue to improve our services. Please take a few minutes to complete the written survey that you may receive in the mail after your visit with us. Thank you!             Your Updated Medication List - Protect others around you: Learn how to safely use, store and throw away your medicines at www.disposemymeds.org.          This list is accurate as of 6/22/18  3:54 PM.  Always use your most recent med list.                   Brand Name Dispense Instructions for use Diagnosis    citalopram 10 MG tablet    celeXA    90 tablet    Take 1 tablet (10 mg) by mouth daily    Major depressive disorder, recurrent, in remission, unspecified (H), Anxiety       * lisdexamfetamine 70 MG capsule    VYVANSE    30 capsule    Take 1 capsule (70 mg) by mouth daily    Attention deficit hyperactivity disorder (ADHD), combined type       * lisdexamfetamine 70 MG capsule   Start taking on:  7/11/2018     VYVANSE    30 capsule    Take 1 capsule (70 mg) by mouth daily    Attention deficit hyperactivity disorder (ADHD), combined type       mupirocin 2 % nasal ointment    BACTROBAN NASAL    22 g    Apply to affected area 3 times/day for 5-7 days    Impetigo       * Notice:  This list has 2 medication(s) that are the same as other medications prescribed for you. Read the directions carefully, and ask your doctor or other care provider to review them with you.

## 2018-06-22 NOTE — PROGRESS NOTES
"  SUBJECTIVE:   Sofie Padilla is a 22 year old female who presents to clinic today for the following health issues:      Acute Illness   Acute illness concerns: sinus  Onset: 2 week    Fever: no     Chills/Sweats: no     Headache (location?): YES    Sinus Pressure:YES    Conjunctivitis:  YES- both    Ear Pain: YES- both    Rhinorrhea: YES    Congestion: YES    Sore Throat: YES     Cough: YES-productive of green sputum, worsening over time    Wheeze: YES    Decreased Appetite: YES    Nausea: YES    Vomiting: no    Diarrhea:  no    Dysuria/Freq.: no    Fatigue/Achiness: YES    Sick/Strep Exposure: no     Therapies Tried and outcome: sinus medication, allegry  Starting to get better. No fever.    Headache  Onset: since Dec - got bad headache, taking a nap in the sun helped a lot. possibly as early as Oct.    Description:   Location: eyes, behind/around eyes   Character: sharp pain  Frequency:  daily  Duration:  Present when wakes, gradually improves as the day goes on    Intensity: at its worst, maybe 7 or 8    Progression of Symptoms:  intermittent    Accompanying Signs & Symptoms:  Stiff neck: no  Neck or upper back pain: YES  Fever: no  Sinus pressure: YES- but not related because only has had past couple weeks  Nausea or vomiting: YES- only in the morning  Dizziness: no   Numbness: no  Weakness: no  Visual changes: no, but \"eyes hurt\"    History:   Head trauma: yes - 11/1/17, fell off porch and hit back of head against a table. Wasn't thinking about it as being related because location was different  Family history of migraines: YES- maybe mom, but not recently  Previous tests for headaches: no  Neurologist evaluations: no  Able to do daily activities: no  Wake with a headaches: YES  Do headaches wake you up: no  Daily pain medication use: no  Work/school stressors/changes: no    Precipitating factors:   Does light make it worse: YES  Does sound make it worse: no    Alleviating factors:  Does sleep help: no. May " help the headache, but more nausea    Therapies Tried and outcome: Tried changing timing of medication (Vyvanse), didn't help  Caffeine didn't help      PHQ-9 6/28/2017 10/4/2017 2/1/2018   Total Score 2 2 2   Q9: Suicide Ideation Not at all Not at all Not at all     SALOMÓN-7 SCORE 6/28/2017 10/4/2017 2/1/2018   Total Score - - -   Total Score 2 3 2       PHQ-9  English  PHQ-9   Any Language  SALOMÓN-7  Suicide Assessment Five-step Evaluation and Treatment (SAFE-T)    Problem list and histories reviewed & adjusted, as indicated.  Additional history: as documented    Patient Active Problem List   Diagnosis     Mild major depression (H)     Allergic rhinitis     Adjustment disorder with disturbance of conduct     ADHD (attention deficit hyperactivity disorder)     Keratosis pilaris     Verruca plantaris     History of varicella as a child     Anxiety     Major depressive disorder, recurrent episode, moderate (H)     Past Surgical History:   Procedure Laterality Date     PE TUBES  age 3     TYMPANOPLASTY  3/19/2013    Procedure: TYMPANOPLASTY;  Right fat graft tympanoplasty;  Surgeon: Jersey Ricardo MD;  Location:  OR       Social History   Substance Use Topics     Smoking status: Never Smoker     Smokeless tobacco: Never Used      Comment: non-smoking household     Alcohol use No     Family History   Problem Relation Age of Onset     C.A.D. Father      Diabetes Father      Pre-diabetes     Arthritis Maternal Grandmother      Cardiovascular Maternal Grandmother      heart disease     Alzheimer Disease Paternal Grandfather      Hypertension Paternal Grandfather      Asthma Brother      Depression Mother      Depression Maternal Grandfather      HEART DISEASE Maternal Aunt      enlarged heart     HEART DISEASE Maternal Aunt      enlarged heart     Cancer Maternal Aunt      HEART DISEASE Maternal Uncle      enlarged heart     GASTROINTESTINAL DISEASE Maternal Uncle      colitis     Cerebrovascular Disease No family hx  "of      Thyroid Disease No family hx of      Glaucoma No family hx of      Macular Degeneration No family hx of          Current Outpatient Prescriptions   Medication Sig Dispense Refill     citalopram (CELEXA) 10 MG tablet Take 1 tablet (10 mg) by mouth daily 90 tablet 1     lisdexamfetamine (VYVANSE) 70 MG capsule Take 1 capsule (70 mg) by mouth daily 30 capsule 0     [START ON 7/11/2018] lisdexamfetamine (VYVANSE) 70 MG capsule Take 1 capsule (70 mg) by mouth daily 30 capsule 0     mupirocin (BACTROBAN NASAL) 2 % nasal ointment Apply to affected area 3 times/day for 5-7 days 22 g 0     Allergies   Allergen Reactions     Nkda [No Known Drug Allergies]        Reviewed and updated as needed this visit by clinical staff  Tobacco  Allergies  Meds  Problems       Reviewed and updated as needed this visit by Provider         ROS:  Constitutional, HEENT, cardiovascular, pulmonary, gi and gu systems are negative, except as otherwise noted.    OBJECTIVE:     /75  Pulse 104  Temp 97.9  F (36.6  C)  Resp 16  Ht 5' 3.5\" (1.613 m)  Wt 136 lb (61.7 kg)  BMI 23.71 kg/m2  Body mass index is 23.71 kg/(m^2).   GENERAL: a little tired appearing, but otherwise healthy, in no distress  EYES: Eyes grossly normal to inspection, PERRL and conjunctivae and sclerae normal  HENT: ear canals and TM's normal, nose and mouth without ulcers or lesions  NECK: no adenopathy, no asymmetry, masses, or scars and thyroid normal to palpation  RESP: lungs clear to auscultation - no rales, rhonchi or wheezes  BREAST: normal without masses, tenderness or nipple discharge and no palpable axillary masses or adenopathy  CV: regular rate and rhythm, normal S1 S2, no S3 or S4, no murmur, click or rub, no peripheral edema and peripheral pulses strong  ABDOMEN: soft, nontender, no hepatosplenomegaly, no masses and bowel sounds normal  MS: no gross musculoskeletal defects noted, no edema  SKIN: no suspicious lesions or rashes  NEURO: Normal " "strength and tone, mentation intact and speech normal  PSYCH: mentation appears normal, affect normal/bright    Diagnostic Test Results:  none     ASSESSMENT:     PLAN:   (R51) Nonintractable episodic headache, unspecified headache type  (primary encounter diagnosis)  Comment: she wanted iron level checked to see if that might be related. Has not had checked since 2010. Uncertain of the headache type. Some features with migraine, but others don't (improving as the day goes on). Would not expect tension headache to do that either. She asked whether her distant history of passing out, shaking, and banging her head might be related, but that occurred in 2012 (can see office visit from 10/15/13 for details) so less likely related to symptoms that only started in Dec 2017. She did have head injury a month prior and was diagnosed with mild concussion in ED so this could be possibly post-concussive headache.  Discussed possibility of MRI. Would be reasonable given morning headaches, but doesn't otherwise have any \"red flag\" signs.   Plan: CBC with platelets and differential  She plans to move next week. Still could consider MRI and try to get her in before she leaves. Otherwise, discussed warning signs and symptoms that would indicate need to be seen in clinic for further evaluation while she is in [place].    (F33.40) Major depressive disorder, recurrent, in remission, unspecified (H)  (F41.9) Anxiety  Comment: stable on citalopram  Plan: has enough refills through the summer. If running out while still in [place], can call or MyChart for refill    (F90.2) Attention deficit hyperactivity disorder (ADHD), combined type  Comment: was last seen 5/10/18 for this, still stable  Plan: same as 5/10 plan    (J06.9) Upper respiratory tract infection, unspecified type  Comment: has lasted about 2 weeks, but is improving.  Plan: discussed possibility of antibiotics, but with no fever and improving symptoms, would not recommend at " this time. She agrees. If symptoms do not continue to improve and/or she develops other symptoms such as fever, antibiotics may be warranted.        Eliazar Dumont MD  [facility]

## 2018-06-22 NOTE — LETTER
[facility]  [address] [place] [address]    June 25, 2018    Sofie Padilla  [address]   [place] [address]    Dear Sofie,    Labs were normal. No signs of iron deficiency    Enclosed is a copy of your results.     Results for orders placed or performed in visit on 06/22/18   CBC with platelets and differential   Result Value Ref Range    WBC 8.3 4.0 - 11.0 10e9/L    RBC Count 4.65 3.8 - 5.2 10e12/L    Hemoglobin 14.3 11.7 - 15.7 g/dL    Hematocrit 41.9 35.0 - 47.0 %    MCV 90 78 - 100 fl    MCH 30.8 26.5 - 33.0 pg    MCHC 34.1 31.5 - 36.5 g/dL    RDW 11.8 10.0 - 15.0 %    Platelet Count 195 150 - 450 10e9/L    Diff Method Automated Method     % Neutrophils 72.0 %    % Lymphocytes 18.0 %    % Monocytes 9.1 %    % Eosinophils 0.7 %    % Basophils 0.2 %    Absolute Neutrophil 6.0 1.6 - 8.3 10e9/L    Absolute Lymphocytes 1.5 0.8 - 5.3 10e9/L    Absolute Monocytes 0.8 0.0 - 1.3 10e9/L    Absolute Eosinophils 0.1 0.0 - 0.7 10e9/L    Absolute Basophils 0.0 0.0 - 0.2 10e9/L   If you have any questions or concerns, please call myself or my nurse at [phone].      Sincerely,        Eliazar Dumont MD/mandie

## 2018-06-25 ENCOUNTER — TELEPHONE (OUTPATIENT)
Dept: PEDIATRICS | Facility: CLINIC | Age: 23
End: 2018-06-25

## 2018-06-25 NOTE — TELEPHONE ENCOUNTER
Called patient and informed her of below information from provider.    Notes Recorded by Eliazar Dumont MD on 6/22/2018 at 5:11 PM  Please notify patient labs were normal. No signs of iron deficiency.  Urvashi SHORT CMA ([facility])

## 2018-06-25 NOTE — TELEPHONE ENCOUNTER
Reason for Call:  Request for results:    Name of test or procedure: labs    Date of test of procedure: 6/22    Location of the test or procedure: [facility]    OK to leave the result message on voice mail or with a family member? YES    Phone number Patient can be reached at:  Home number on file [phone] (home)    Additional comments: Patient would lab results.     Call taken on 6/25/2018 at 4:51 PM by Wang Santillan

## 2018-07-17 ENCOUNTER — TELEPHONE (OUTPATIENT)
Dept: PEDIATRICS | Facility: CLINIC | Age: 23
End: 2018-07-17

## 2018-07-17 DIAGNOSIS — F90.2 ATTENTION DEFICIT HYPERACTIVITY DISORDER (ADHD), COMBINED TYPE: Primary | ICD-10-CM

## 2018-07-17 NOTE — TELEPHONE ENCOUNTER
Reason for Call:  Medication or medication refill:    Do you use a [facility]?  Name of the pharmacy and phone number for the current request:  Will  prescription at     Name of the medication requested: lisdexamfetamine (VYVANSE) 70 MG capsule     Other request: Patient will be out of town for a few months. Patient just filled last prescription given. Needs 3 month supply. Please call when ready    Can we leave a detailed message on this number? YES    Phone number patient can be reached at: Home number on file [phone] (home)    Best Time: ASAP    Call taken on 7/17/2018 at 12:23 PM by Elke Isabel

## 2018-07-17 NOTE — TELEPHONE ENCOUNTER
Controlled Substance Refill Request for lisdexamfetamine (VYVANSE) 70 MG capsule  Problem List Complete:  No     PROVIDER TO CONSIDER COMPLETION OF PROBLEM LIST AND OVERVIEW/CONTROLLED SUBSTANCE AGREEMENT    Last Written Prescription Date:  7-11-18  Last Fill Quantity: 30,   # refills: 0    Last Office Visit with [facility] primary care provider: 6-22-18    Future Office visit:     Controlled substance agreement on file: No.     Processing:  Patient will  in clinic   checked in past 3 months?  No, route to RN

## 2018-07-17 NOTE — TELEPHONE ENCOUNTER
reviewed. No concerns.    Last dispensed 6/8/18 #30 for a 30 day supply.    Urvashi Montana RN  [facility]

## 2018-07-18 RX ORDER — LISDEXAMFETAMINE DIMESYLATE 70 MG/1
70 CAPSULE ORAL DAILY
Qty: 30 CAPSULE | Refills: 0 | Status: SHIPPED | OUTPATIENT
Start: 2018-07-18 | End: 2018-08-17

## 2018-07-18 RX ORDER — LISDEXAMFETAMINE DIMESYLATE 70 MG/1
70 CAPSULE ORAL DAILY
Qty: 30 CAPSULE | Refills: 0 | Status: SHIPPED | OUTPATIENT
Start: 2018-08-18 | End: 2018-09-17

## 2018-07-18 RX ORDER — LISDEXAMFETAMINE DIMESYLATE 70 MG/1
70 CAPSULE ORAL DAILY
Qty: 30 CAPSULE | Refills: 0 | Status: SHIPPED | OUTPATIENT
Start: 2018-09-18 | End: 2018-10-04

## 2018-07-18 NOTE — TELEPHONE ENCOUNTER
Left message to call TC line at [phone]. Ariadna Lambert,     lisdexamfetamine (VYVANSE) 70 MG capsule

## 2018-07-18 NOTE — TELEPHONE ENCOUNTER
Prescriptions printed and signed.    Please notify patient prescriptions ready.  Prescriptions are in  basket    Dr. Suki Dumont

## 2018-07-23 NOTE — TELEPHONE ENCOUNTER
Left message for Sofie    Paper prescription is down at [facility]  ready for . Ariadna Lambert,     Designated pick-up person will need to provided a photo ID at time of .    Designated pick-up person Sofie Padilla self    Patients mother Kelin picked up script at

## 2018-07-30 NOTE — TELEPHONE ENCOUNTER
Reason for Call:  Other returning call    Detailed comments: Patient calling to say her mom Marybel is going to  her prescription at the  I let  know she will be coming. She will bring a picture ID    Phone Number Patient can be reached at: Home number on file [phone] (home)    Best Time: Any    Can we leave a detailed message on this number? YES    Call taken on 7/30/2018 at 12:36 PM by Elke Isabel

## 2018-08-10 DIAGNOSIS — F33.40 MAJOR DEPRESSIVE DISORDER, RECURRENT, IN REMISSION, UNSPECIFIED (H): ICD-10-CM

## 2018-08-10 DIAGNOSIS — F41.9 ANXIETY: ICD-10-CM

## 2018-08-10 NOTE — TELEPHONE ENCOUNTER
Called patient and left VM to call clinic to completed PHQ-9. Red team number left.  Urvashi SHORT CMA ([facility])

## 2018-08-13 NOTE — TELEPHONE ENCOUNTER
Called patient no answer left message to return call.   Red team number left.   Thank you  Eliza

## 2018-08-15 RX ORDER — CITALOPRAM HYDROBROMIDE 10 MG/1
TABLET ORAL
Qty: 30 TABLET | Refills: 0 | Status: SHIPPED | OUTPATIENT
Start: 2018-08-15 | End: 2018-09-15

## 2018-08-15 ASSESSMENT — PATIENT HEALTH QUESTIONNAIRE - PHQ9: SUM OF ALL RESPONSES TO PHQ QUESTIONS 1-9: 9

## 2018-08-15 NOTE — PROGRESS NOTES
SUBJECTIVE:   Sofie Padilla is a 22 year old female who presents to clinic today for the following health issues:      Depression and Anxiety Follow-Up      Status since last visit: No change    Other associated symptoms:None    Complicating factors:     Significant life event: Yes-  Moved to [place] for the summer     Current substance abuse: None    PHQ-9 10/4/2017 2/1/2018 8/14/2018   Total Score 2 2 9   Q9: Suicide Ideation Not at all Not at all Not at all     SALOMÓN-7 SCORE 6/28/2017 10/4/2017 2/1/2018   Total Score - - -   Total Score 2 3 2       PHQ-9  English  PHQ-9   Any Language  SALOMÓN-7  Suicide Assessment Five-step Evaluation and Treatment (SAFE-T)      Amount of exercise or physical activity: weekly    Problems taking medications regularly: No    Medication side effects: possibly... headaches    Diet: regular (no restrictions)      Headaches      Duration: 5 years, worse over the past month    Description  Location: has different kinds of pains  Character: squeezing pain, tight, sore, stiff pain and sometimes feels tingling on the top of head  Frequency:  daily  Duration:  Starts when she wakes up and lasts until 12 noon, then gets nauseous and will eventually feel better by 6pm    Intensity:  severe    Accompanying signs and symptoms:    Precipitating or Alleviating factors:  Nausea/vomiting: once or twice   Dizziness: most of the time  Weakness or numbness: weakness  Visual changes: Yes, blurry and has seen stars  Fever: no   Sinus or URI symptoms no     History  Head trauma: YES- last Halloween fell and diagnosed with a mild concussion  Family history of migraines: YES- possibly mom  Previous tests for headaches: YES, states she's had in MRI  Neurologist evaluations: no  Able to do daily activities when headache present: YES- but not always  Wake with headaches: YES  Daily pain medication use: no   Any changes in: none    Precipitating or Alleviating factors (light/sound/sleep/caffeine): focusing on  things can bring them on or laughing too hard    Therapies tried and outcome: Ibuprofen (Advil, Motrin)    Outcome - has been effective but doesn't always work  Frequent/daily pain medication use: no    Had a really bad headache this summer causing her to miss work- was up in the middle of the night rocking back and forth because it was too painful to hold still, vomited, then slept 19 hours.  Has episodes multiple times/week where she's staring off into space, moving slowly, and people ask if she's ok. History of one seizure at age 15 & was having memory loss at that time- had normal brain MRI.  Twice/year has presyncopal episodes ongoing for many years, has actually lost consciousness twice.       Problem list and histories reviewed & adjusted, as indicated.  Additional history: as documented    Patient Active Problem List   Diagnosis     Mild major depression (H)     Allergic rhinitis     Adjustment disorder with disturbance of conduct     ADHD (attention deficit hyperactivity disorder)     Keratosis pilaris     Verruca plantaris     History of varicella as a child     Anxiety     Major depressive disorder, recurrent episode, moderate (H)     Headache, unspecified headache type     Pre-syncope     Past Surgical History:   Procedure Laterality Date     PE TUBES  age 3     TYMPANOPLASTY  3/19/2013    Procedure: TYMPANOPLASTY;  Right fat graft tympanoplasty;  Surgeon: Jersey Ricardo MD;  Location:  OR       Social History   Substance Use Topics     Smoking status: Never Smoker     Smokeless tobacco: Never Used      Comment: non-smoking household     Alcohol use No     Family History   Problem Relation Age of Onset     C.A.D. Father      Diabetes Father      Pre-diabetes     Arthritis Maternal Grandmother      Cardiovascular Maternal Grandmother      heart disease     Alzheimer Disease Paternal Grandfather      Hypertension Paternal Grandfather      Asthma Brother      Depression Mother      Depression  "Maternal Grandfather      HEART DISEASE Maternal Aunt      enlarged heart     HEART DISEASE Maternal Aunt      enlarged heart     Cancer Maternal Aunt      HEART DISEASE Maternal Uncle      enlarged heart     GASTROINTESTINAL DISEASE Maternal Uncle      colitis     Cerebrovascular Disease No family hx of      Thyroid Disease No family hx of      Glaucoma No family hx of      Macular Degeneration No family hx of            Reviewed and updated as needed this visit by clinical staff       Reviewed and updated as needed this visit by Provider         ROS:  Constitutional, HEENT, cardiovascular, pulmonary, musculoskeletal, neuro, and psych systems are negative, except as otherwise noted.    OBJECTIVE:     /68 (BP Location: Left arm, Patient Position: Chair, Cuff Size: Adult Regular)  Pulse 112  Temp 97  F (36.1  C) (Oral)  Ht 5' 3.5\" (1.613 m)  Wt 135 lb (61.2 kg)  SpO2 99%  BMI 23.54 kg/m2  Body mass index is 23.54 kg/(m^2).  GENERAL: healthy, alert and no distress  EYES: Eyes grossly normal to inspection, PERRL and conjunctivae and sclerae normal  HENT: ear canals and TM's normal, nose and mouth without ulcers or lesions  NECK: no adenopathy, no asymmetry, masses, or scars and thyroid normal to palpation  RESP: lungs clear to auscultation - no rales, rhonchi or wheezes  CV: regular rate and rhythm, normal S1 S2, no S3 or S4, no murmur, click or rub, no peripheral edema and peripheral pulses strong  MS: no gross musculoskeletal defects noted, no edema  SKIN: no suspicious lesions or rashes  NEURO: Normal strength and tone, sensory exam grossly normal, mentation intact, cranial nerves 2-12 intact, gait normal including heel/toe/tandem walking, Romberg normal and rapid alternating movements normal  PSYCH: mentation appears normal, affect normal/bright    Diagnostic Test Results:  EKG- unremarkable    Results for orders placed or performed in visit on 08/30/18 (from the past 24 hour(s))   Basic metabolic panel "   Result Value Ref Range    Sodium 142 133 - 144 mmol/L    Potassium 4.1 3.4 - 5.3 mmol/L    Chloride 107 94 - 109 mmol/L    Carbon Dioxide 25 20 - 32 mmol/L    Anion Gap 10 3 - 14 mmol/L    Glucose 94 70 - 99 mg/dL    Urea Nitrogen 15 7 - 30 mg/dL    Creatinine 0.59 0.52 - 1.04 mg/dL    GFR Estimate >90 >60 mL/min/1.7m2    GFR Estimate If Black >90 >60 mL/min/1.7m2    Calcium 9.1 8.5 - 10.1 mg/dL   TSH with free T4 reflex   Result Value Ref Range    TSH 0.61 0.40 - 4.00 mU/L       ASSESSMENT/PLAN:       1. Headache, unspecified headache type  Severity of headaches is worrisome- possibly migraine but pattern is atypical. Coupled with the pre-syncopal episodes and prior history of seizure, this is worrisome- will obtain MRI and then follow up with Neurology for further evaluation. If deemed migraines, then prophylactic medications such as Amitriptyline may benefit her.  - MR Brain w/o Contrast; Future  - NEUROLOGY ADULT REFERRAL    2. Pre-syncope  No obvious triggers. Will start cardiac work-up today. Will hold off on Zio patch placement until the MRI is complete.  - Basic metabolic panel  - TSH with free T4 reflex  - EKG 12-lead complete w/read - Clinics  - Zio Patch 24 Hours; Future    Follow-up: MRI, Zio Patch, schedule Neurology consult    EPRNELL Benz CNP  [facility]

## 2018-08-30 ENCOUNTER — OFFICE VISIT (OUTPATIENT)
Dept: FAMILY MEDICINE | Facility: CLINIC | Age: 23
End: 2018-08-30
Payer: COMMERCIAL

## 2018-08-30 ENCOUNTER — HOSPITAL ENCOUNTER (OUTPATIENT)
Dept: MRI IMAGING | Facility: CLINIC | Age: 23
Discharge: HOME | End: 2018-08-30
Attending: NURSE PRACTITIONER | Admitting: NURSE PRACTITIONER
Payer: COMMERCIAL

## 2018-08-30 VITALS
TEMPERATURE: 97 F | OXYGEN SATURATION: 99 % | DIASTOLIC BLOOD PRESSURE: 68 MMHG | BODY MASS INDEX: 23.05 KG/M2 | WEIGHT: 135 LBS | HEART RATE: 112 BPM | HEIGHT: 64 IN | SYSTOLIC BLOOD PRESSURE: 120 MMHG

## 2018-08-30 DIAGNOSIS — R51.9 HEADACHE, UNSPECIFIED HEADACHE TYPE: ICD-10-CM

## 2018-08-30 DIAGNOSIS — R51.9 HEADACHE, UNSPECIFIED HEADACHE TYPE: Primary | ICD-10-CM

## 2018-08-30 DIAGNOSIS — R55 PRE-SYNCOPE: ICD-10-CM

## 2018-08-30 LAB
ANION GAP SERPL CALCULATED.3IONS-SCNC: 10 MMOL/L (ref 3–14)
BUN SERPL-MCNC: 15 MG/DL (ref 7–30)
CALCIUM SERPL-MCNC: 9.1 MG/DL (ref 8.5–10.1)
CHLORIDE SERPL-SCNC: 107 MMOL/L (ref 94–109)
CO2 SERPL-SCNC: 25 MMOL/L (ref 20–32)
CREAT SERPL-MCNC: 0.59 MG/DL (ref 0.52–1.04)
GFR SERPL CREATININE-BSD FRML MDRD: >90 ML/MIN/1.7M2
GLUCOSE SERPL-MCNC: 94 MG/DL (ref 70–99)
POTASSIUM SERPL-SCNC: 4.1 MMOL/L (ref 3.4–5.3)
SODIUM SERPL-SCNC: 142 MMOL/L (ref 133–144)
TSH SERPL DL<=0.005 MIU/L-ACNC: 0.61 MU/L (ref 0.4–4)

## 2018-08-30 PROCEDURE — 93000 ELECTROCARDIOGRAM COMPLETE: CPT | Performed by: NURSE PRACTITIONER

## 2018-08-30 PROCEDURE — 36415 COLL VENOUS BLD VENIPUNCTURE: CPT | Performed by: NURSE PRACTITIONER

## 2018-08-30 PROCEDURE — 84443 ASSAY THYROID STIM HORMONE: CPT | Performed by: NURSE PRACTITIONER

## 2018-08-30 PROCEDURE — 80048 BASIC METABOLIC PNL TOTAL CA: CPT | Performed by: NURSE PRACTITIONER

## 2018-08-30 PROCEDURE — 70551 MRI BRAIN STEM W/O DYE: CPT

## 2018-08-30 PROCEDURE — 99214 OFFICE O/P EST MOD 30 MIN: CPT | Performed by: NURSE PRACTITIONER

## 2018-08-30 ASSESSMENT — PATIENT HEALTH QUESTIONNAIRE - PHQ9: 5. POOR APPETITE OR OVEREATING: NOT AT ALL

## 2018-08-30 ASSESSMENT — ANXIETY QUESTIONNAIRES
6. BECOMING EASILY ANNOYED OR IRRITABLE: NOT AT ALL
IF YOU CHECKED OFF ANY PROBLEMS ON THIS QUESTIONNAIRE, HOW DIFFICULT HAVE THESE PROBLEMS MADE IT FOR YOU TO DO YOUR WORK, TAKE CARE OF THINGS AT HOME, OR GET ALONG WITH OTHER PEOPLE: NOT DIFFICULT AT ALL
2. NOT BEING ABLE TO STOP OR CONTROL WORRYING: NOT AT ALL
5. BEING SO RESTLESS THAT IT IS HARD TO SIT STILL: NOT AT ALL
GAD7 TOTAL SCORE: 0
7. FEELING AFRAID AS IF SOMETHING AWFUL MIGHT HAPPEN: NOT AT ALL
3. WORRYING TOO MUCH ABOUT DIFFERENT THINGS: NOT AT ALL
1. FEELING NERVOUS, ANXIOUS, OR ON EDGE: NOT AT ALL

## 2018-08-30 ASSESSMENT — PAIN SCALES - GENERAL: PAINLEVEL: MODERATE PAIN (4)

## 2018-08-30 NOTE — MR AVS SNAPSHOT
After Visit Summary   8/30/2018    Sofie Padilla    MRN: [id number]           Patient Information     Date Of Birth          1995        Visit Information        Provider Department      8/30/2018 2:00 PM Abbey De Leno APRN CNP [facility]        Today's Diagnoses     Headache, unspecified headache type    -  1    Pre-syncope          Care Instructions    [facility]    If you have any questions regarding to your visit please contact your care team:       Team Red:   Clinic Hours Telephone Number   Dr. Graciela De Leon, NP   7am-7pm  Monday - Thursday   7am-5pm  Fridays  [phone]  (Appointment scheduling available 24/7)    Questions about your recent visit?   Team Line  [phone]   Urgent Care - [facility] - 11am-9pm Monday-Friday Saturday-Sunday- 9am-5pm   [place] - 5pm-9pm Monday-Friday Saturday-Sunday- 9am-5pm  [phone] - [facility]  [phone] - [place]       What options do I have for a visit other than an office visit? We offer electronic visits (e-visits) and telephone visits, when medically appropriate.  Please check with your medical insurance to see if these types of visits are covered, as you will be responsible for any charges that are not paid by your insurance.      You can use [employer] (secure electronic communication) to access to your chart, send your primary care provider a message, or make an appointment. Ask a team member how to get started.     For a price quote for your services, please call our Consumer Price Line at [phone] or our Imaging Cost estimation line at [phone] (for imaging tests).      Discharged by Nicole Martinez MA.            Follow-ups after your visit        Additional Services     NEUROLOGY ADULT REFERRAL       Your provider has referred you for the following:   Consult at [facility]: [facility] [place]  [place] [phone]   [email or web address]  [facility]: [facility] [phone]   [email or web address]    Please be aware that coverage of these services is subject to the terms and limitations of your health insurance plan.  Call member services at your health plan with any benefit or coverage questions.      Please bring the following with you to your appointment:    (1) Any X-Rays, CTs or MRIs which have been performed.  Contact the facility where they were done to arrange for  prior to your scheduled appointment.    (2) List of current medications  (3) This referral request   (4) Any documents/labs given to you for this referral                  Your next 10 appointments already scheduled     Aug 30, 2018  6:30 PM CDT   MR BRAIN W/O CONTRAST with [id number]   [facility] ([facility])    [address] [place] [id number] [phone]           Take your medicines as usual, unless your doctor tells you not to. Bring a list of your current medicines to your exam (including vitamins, minerals and over-the-counter drugs). Also bring the results of similar scans you may have had.  Please remove any body piercings and hair extensions before you arrive.  Follow your doctor s orders. If you do not, we may have to postpone your exam.  You may or may not receive IV contrast for this exam pending the discretion of the Radiologist.  You do not need to do anything special to prepare.  The MRI machine uses a strong magnet. Please wear clothes without metal (snaps, zippers). A sweatsuit works well, or we may give you a hospital gown.   **IMPORTANT** THE INSTRUCTIONS BELOW ARE ONLY FOR THOSE PATIENTS WHO HAVE BEEN PRESCRIBED SEDATION OR GENERAL ANESTHESIA DURING THEIR MRI PROCEDURE:  IF YOUR DOCTOR PRESCRIBED ORAL SEDATION (take medicine to help you relax during your exam):   You must get the medicine from your doctor (oral  medication) before you arrive. Bring the medicine to the exam. Do not take it at home. You ll be told when to take it upon arriving for your exam.   Arrive one hour early. Bring someone who can take you home after the test. Your medicine will make you sleepy. After the exam, you may not drive, take a bus or take a taxi by yourself.  IF YOUR DOCTOR PRESCRIBED IV SEDATION:   Arrive one hour early. Bring someone who can take you home after the test. Your medicine will make you sleepy. After the exam, you may not drive, take a bus or take a taxi by yourself.   No eating 6 hours before your exam. You may have clear liquids up until 4 hours before your exam. (Clear liquids include water, clear tea, black coffee and fruit juice without pulp.)  IF YOUR DOCTOR PRESCRIBED ANESTHESIA (be asleep for your exam):   Arrive 1 1/2 hours early. Bring someone who can take you home after the test. You may not drive, take a bus or take a taxi by yourself.   No eating 8 hours before your exam. You may have clear liquids up until 4 hours before your exam. (Clear liquids include water, clear tea, black coffee and fruit juice without pulp.)   You will spend four to five hours in the recovery room.  Please call the Imaging Department at your exam site with any questions.              Future tests that were ordered for you today     Open Future Orders        Priority Expected Expires Ordered    MR Brain w/o Contrast Routine  8/30/2019 8/30/2018            Who to contact     If you have questions or need follow up information about today's clinic visit or your schedule please contact [facility] directly at [phone].  Normal or non-critical lab and imaging results will be communicated to you by MyChart, letter or phone within 4 business days after the clinic has received the results. If you do not hear from us within 7 days, please contact the clinic through MyChart or phone. If you have a critical or abnormal lab result, we  "will notify you by phone as soon as possible.  Submit refill requests through [employer] or call your pharmacy and they will forward the refill request to us. Please allow 3 business days for your refill to be completed.          Additional Information About Your Visit        Care EveryWhere ID     This is your Care EveryWhere ID. This could be used by other organizations to access your [place] medical records  [id number]        Your Vitals Were     Pulse Temperature Height Pulse Oximetry BMI (Body Mass Index)       112 97  F (36.1  C) (Oral) 5' 3.5\" (1.613 m) 99% 23.54 kg/m2        Blood Pressure from Last 3 Encounters:   08/30/18 120/68   06/22/18 118/75   05/10/18 118/60    Weight from Last 3 Encounters:   08/30/18 135 lb (61.2 kg)   06/22/18 136 lb (61.7 kg)   05/10/18 139 lb (63 kg)              We Performed the Following     Basic metabolic panel     EKG 12-lead complete w/read - Clinics     NEUROLOGY ADULT REFERRAL     TSH with free T4 reflex        Primary Care Provider Office Phone # Fax #    Eliazar Maribel Tono Dumont [phone]       [address] [facility] [address]        Equal Access to Services     CRYSTAL ARMENTA : Hadii mica ku hadasho Soomaali, waaxda luqadaha, qaybta kaalmada adeegyada, marline munguia. So [facility] [phone].    ATENCIÓN: Si habla español, tiene a noyola disposición servicios gratuitos de asistencia lingüística. Llame al [phone].    We comply with applicable federal civil rights laws and [place] laws. We do not discriminate on the basis of race, color, national origin, age, disability, sex, sexual orientation, or gender identity.            Thank you!     Thank you for choosing [facility]  for your care. Our goal is always to provide you with excellent care. Hearing back from our patients is one way we can continue to improve our services. Please take a few minutes to complete the written survey that you may receive " in the mail after your visit with us. Thank you!             Your Updated Medication List - Protect others around you: Learn how to safely use, store and throw away your medicines at www.disposemymeds.org.          This list is accurate as of 8/30/18  3:17 PM.  Always use your most recent med list.                   Brand Name Dispense Instructions for use Diagnosis    citalopram 10 MG tablet    celeXA    30 tablet    TAKE 1 TABLET(10 MG) BY MOUTH DAILY    Major depressive disorder, recurrent, in remission, unspecified (H), Anxiety       * lisdexamfetamine 70 MG capsule    VYVANSE    30 capsule    Take 1 capsule (70 mg) by mouth daily    Attention deficit hyperactivity disorder (ADHD), combined type       * lisdexamfetamine 70 MG capsule    VYVANSE    30 capsule    Take 1 capsule (70 mg) by mouth daily    Attention deficit hyperactivity disorder (ADHD), combined type       * lisdexamfetamine 70 MG capsule   Start taking on:  9/18/2018    VYVANSE    30 capsule    Take 1 capsule (70 mg) by mouth daily    Attention deficit hyperactivity disorder (ADHD), combined type       mupirocin 2 % nasal ointment    BACTROBAN NASAL    22 g    Apply to affected area 3 times/day for 5-7 days    Impetigo       * Notice:  This list has 3 medication(s) that are the same as other medications prescribed for you. Read the directions carefully, and ask your doctor or other care provider to review them with you.

## 2018-08-30 NOTE — LETTER
[facility]  [address] [place] [address]    August 31, 2018    Sofie Padilla  [address] [address]          Dear Sofie,  Your results are normal.   Enclosed is a copy of your results.     Results for orders placed or performed in visit on 08/30/18   Basic metabolic panel   Result Value Ref Range    Sodium 142 133 - 144 mmol/L    Potassium 4.1 3.4 - 5.3 mmol/L    Chloride 107 94 - 109 mmol/L    Carbon Dioxide 25 20 - 32 mmol/L    Anion Gap 10 3 - 14 mmol/L    Glucose 94 70 - 99 mg/dL    Urea Nitrogen 15 7 - 30 mg/dL    Creatinine 0.59 0.52 - 1.04 mg/dL    GFR Estimate >90 >60 mL/min/1.7m2    GFR Estimate If Black >90 >60 mL/min/1.7m2    Calcium 9.1 8.5 - 10.1 mg/dL   TSH with free T4 reflex   Result Value Ref Range    TSH 0.61 0.40 - 4.00 mU/L       If you have any questions or concerns, please call myself or my nurse at [phone].      Sincerely,      Abbey De Leon CNP/pb

## 2018-08-30 NOTE — PATIENT INSTRUCTIONS
[facility]    If you have any questions regarding to your visit please contact your care team:       Team Red:   Clinic Hours Telephone Number   Dr. Graciela De Leon, NP   7am-7pm  Monday - Thursday   7am-5pm  Fridays  [phone]  (Appointment scheduling available 24/7)    Questions about your recent visit?   Team Line  [phone]   Urgent Care - [place] and [facility] - 11am-9pm Monday-Friday Saturday-Sunday- 9am-5pm   [place] - 5pm-9pm Monday-Friday Saturday-Sunday- 9am-5pm  [phone] - [place]  [phone] - [place]       What options do I have for a visit other than an office visit? We offer electronic visits (e-visits) and telephone visits, when medically appropriate.  Please check with your medical insurance to see if these types of visits are covered, as you will be responsible for any charges that are not paid by your insurance.      You can use [employer] (secure electronic communication) to access to your chart, send your primary care provider a message, or make an appointment. Ask a team member how to get started.     For a price quote for your services, please call our Consumer Price Line at [phone] or our Imaging Cost estimation line at [phone] (for imaging tests).      Discharged by Nicole Martinez MA.

## 2018-08-31 ASSESSMENT — ANXIETY QUESTIONNAIRES: GAD7 TOTAL SCORE: 0

## 2018-08-31 ASSESSMENT — PATIENT HEALTH QUESTIONNAIRE - PHQ9: SUM OF ALL RESPONSES TO PHQ QUESTIONS 1-9: 15

## 2018-08-31 NOTE — PROGRESS NOTES
Please call the patient with these results:    Sofie, good news- your brain MRI is normal. Please follow up with Neurology as planned.    Your labs are also normal. You may return to clinic at any time to have the Zio Patch (heart monitor) placed. Please schedule an ancillary appointment for this.    Abbey De Leon, CNP

## 2018-09-15 DIAGNOSIS — F33.40 MAJOR DEPRESSIVE DISORDER, RECURRENT, IN REMISSION, UNSPECIFIED (H): ICD-10-CM

## 2018-09-15 DIAGNOSIS — F41.9 ANXIETY: ICD-10-CM

## 2018-09-17 NOTE — TELEPHONE ENCOUNTER
"Routing refill request to provider for review/approval because:  Failed [facility] refill protocol, see below:        PHQ-9 SCORE 2/1/2018 8/14/2018 8/30/2018   Total Score - - -   Total Score 2 9 15     Requested Prescriptions   Pending Prescriptions Disp Refills     citalopram (CELEXA) 10 MG tablet [Pharmacy Med Name: CITALOPRAM 10MG TABLETS]  Last Written Prescription Date:  8/15/18  Last Fill Quantity: 30,  # refills: 0   Last office visit: 6/22/2018 with prescribing provider:     Future Office Visit:     30 tablet 0     Sig: TAKE 1 TABLET(10 MG) BY MOUTH DAILY    SSRIs Protocol Failed    9/17/2018  7:18 AM       Failed - PHQ-9 score less than 5 in past 6 months    Please review last PHQ-9 score.          Passed - Patient is age 18 or older       Passed - No active pregnancy on record       Passed - No positive pregnancy test in last 12 months       Passed - Recent (6 mo) or future (30 days) visit within the authorizing provider's specialty    Patient had office visit in the last 6 months or has a visit in the next 30 days with authorizing provider or within the authorizing provider's specialty.  See \"Patient Info\" tab in inbasket, or \"Choose Columns\" in Meds & Orders section of the refill encounter.            Ángela Desouza RN - BC      "

## 2018-09-20 RX ORDER — CITALOPRAM HYDROBROMIDE 10 MG/1
TABLET ORAL
Qty: 90 TABLET | Refills: 0 | Status: SHIPPED | OUTPATIENT
Start: 2018-09-20 | End: 2018-11-20

## 2018-09-20 NOTE — TELEPHONE ENCOUNTER
Called patient and left VM to call clinic. Red team number left.  Urvashi SHORT CMA ([facility])

## 2018-09-20 NOTE — TELEPHONE ENCOUNTER
Please call - 3 month refill approved. Will need to follow up before further refills - if staying in [place], should establish care there.      The following is for documentation purposes, no need to tell patient:  Last PHQ-9 was worse (15), but she noted that all the symptom scores were higher due to her headaches.    Dr. Suki Dumont

## 2018-10-03 ENCOUNTER — ALLIED HEALTH/NURSE VISIT (OUTPATIENT)
Dept: NURSING | Facility: CLINIC | Age: 23
End: 2018-10-03
Payer: COMMERCIAL

## 2018-10-03 DIAGNOSIS — R55 PRE-SYNCOPE: ICD-10-CM

## 2018-10-03 PROCEDURE — 0296T ZZHC  EXT ECG > 48HR TO 21 DAY RCRD W/CONECT INTL RCRD: CPT | Performed by: INTERNAL MEDICINE

## 2018-10-03 PROCEDURE — 0298T ZZC EXT ECG > 48HR TO 21 DAY REVIEW AND INTERPRETATN: CPT | Performed by: INTERNAL MEDICINE

## 2018-10-03 PROCEDURE — 0296T ZIO PATCH 24 HOURS: CPT

## 2018-10-03 NOTE — MR AVS SNAPSHOT
After Visit Summary   10/3/2018    Sofie Padilla    MRN: [id number]           Patient Information     Date Of Birth          1995        Visit Information        Provider Department      10/3/2018 1:20 PM FZ ANCILLARY [facility]        Today's Diagnoses     Pre-syncope           Follow-ups after your visit        Your next 10 appointments already scheduled     Oct 04, 2018  2:20 PM CDT   Office Visit with Eliazar Dumont MD   [facility] ([facility])    [address] [id number] [phone]           Bring a current list of meds and any records pertaining to this visit. For Physicals, please bring immunization records and any forms needing to be filled out. Please arrive 10 minutes early to complete paperwork.              Who to contact     If you have questions or need follow up information about today's clinic visit or your schedule please contact [facility] directly at [phone].  Normal or non-critical lab and imaging results will be communicated to you by MyChart, letter or phone within 4 business days after the clinic has received the results. If you do not hear from us within 7 days, please contact the clinic through MyChart or phone. If you have a critical or abnormal lab result, we will notify you by phone as soon as possible.  Submit refill requests through [employer] or call your pharmacy and they will forward the refill request to us. Please allow 3 business days for your refill to be completed.          Additional Information About Your Visit        Care EveryWhere ID     This is your Care EveryWhere ID. This could be used by other organizations to access your [facility] medical records  [id number]         Blood Pressure from Last 3 Encounters:   08/30/18 120/68   06/22/18 118/75   05/10/18 118/60    Weight from Last 3 Encounters:   08/30/18 135 lb (61.2 kg)   06/22/18 136 lb (61.7 kg)    05/10/18 139 lb (63 kg)              We Performed the Following     Zio Patch 24 Hours        Primary Care Provider Office Phone # Fax #    Eliazar Maribel Dumont [phone] [address] [address]        Equal Access to Services     [facility]: Hadii aad ku hadasho Soomaali, waaxda luqadaha, qaybta kaalmada adeegyada, waxay idiin hayaan adeeg kharash la'aan . So [facility] [phone].    ATENCIÓN: Si habla español, tiene a noyola disposición servicios gratuitos de asistencia lingüística. Llame al [phone].    We comply with applicable federal civil rights laws and [place] laws. We do not discriminate on the basis of race, color, national origin, age, disability, sex, sexual orientation, or gender identity.            Thank you!     Thank you for choosing [facility]  for your care. Our goal is always to provide you with excellent care. Hearing back from our patients is one way we can continue to improve our services. Please take a few minutes to complete the written survey that you may receive in the mail after your visit with us. Thank you!             Your Updated Medication List - Protect others around you: Learn how to safely use, store and throw away your medicines at www.disposemymeds.org.          This list is accurate as of 10/3/18  1:49 PM.  Always use your most recent med list.                   Brand Name Dispense Instructions for use Diagnosis    citalopram 10 MG tablet    celeXA    90 tablet    TAKE 1 TABLET(10 MG) BY MOUTH DAILY    Major depressive disorder, recurrent, in remission, unspecified (H), Anxiety       * lisdexamfetamine 70 MG capsule    VYVANSE    30 capsule    Take 1 capsule (70 mg) by mouth daily    Attention deficit hyperactivity disorder (ADHD), combined type       * lisdexamfetamine 70 MG capsule    VYVANSE    30 capsule    Take 1 capsule (70 mg) by mouth daily    Attention deficit hyperactivity disorder (ADHD), combined  type       mupirocin 2 % nasal ointment    BACTROBAN NASAL    22 g    Apply to affected area 3 times/day for 5-7 days    Impetigo       * Notice:  This list has 2 medication(s) that are the same as other medications prescribed for you. Read the directions carefully, and ask your doctor or other care provider to review them with you.

## 2018-10-03 NOTE — NURSING NOTE
Per Abbey De Leon, patient came in and had 24 hour zio patch put on.  Patient instructed in use.  Diary use explained.  Questions answered. Paper work given to TC to register device.  Urvashi SHORT CMA ([facility])

## 2018-10-04 ENCOUNTER — OFFICE VISIT (OUTPATIENT)
Dept: PEDIATRICS | Facility: CLINIC | Age: 23
End: 2018-10-04
Payer: COMMERCIAL

## 2018-10-04 VITALS
BODY MASS INDEX: 23.74 KG/M2 | DIASTOLIC BLOOD PRESSURE: 85 MMHG | RESPIRATION RATE: 18 BRPM | OXYGEN SATURATION: 97 % | TEMPERATURE: 98.8 F | SYSTOLIC BLOOD PRESSURE: 140 MMHG | HEART RATE: 104 BPM | HEIGHT: 63 IN | WEIGHT: 134 LBS

## 2018-10-04 DIAGNOSIS — F32.0 MILD MAJOR DEPRESSION (H): Primary | ICD-10-CM

## 2018-10-04 DIAGNOSIS — F41.9 ANXIETY: ICD-10-CM

## 2018-10-04 DIAGNOSIS — Z23 NEED FOR PROPHYLACTIC VACCINATION AND INOCULATION AGAINST INFLUENZA: ICD-10-CM

## 2018-10-04 PROCEDURE — 90686 IIV4 VACC NO PRSV 0.5 ML IM: CPT | Mod: SL | Performed by: PEDIATRICS

## 2018-10-04 PROCEDURE — 99214 OFFICE O/P EST MOD 30 MIN: CPT | Mod: 25 | Performed by: PEDIATRICS

## 2018-10-04 PROCEDURE — 90471 IMMUNIZATION ADMIN: CPT | Performed by: PEDIATRICS

## 2018-10-04 ASSESSMENT — ANXIETY QUESTIONNAIRES
3. WORRYING TOO MUCH ABOUT DIFFERENT THINGS: NOT AT ALL
7. FEELING AFRAID AS IF SOMETHING AWFUL MIGHT HAPPEN: NOT AT ALL
5. BEING SO RESTLESS THAT IT IS HARD TO SIT STILL: NOT AT ALL
6. BECOMING EASILY ANNOYED OR IRRITABLE: SEVERAL DAYS
2. NOT BEING ABLE TO STOP OR CONTROL WORRYING: NOT AT ALL
1. FEELING NERVOUS, ANXIOUS, OR ON EDGE: SEVERAL DAYS
GAD7 TOTAL SCORE: 3
IF YOU CHECKED OFF ANY PROBLEMS ON THIS QUESTIONNAIRE, HOW DIFFICULT HAVE THESE PROBLEMS MADE IT FOR YOU TO DO YOUR WORK, TAKE CARE OF THINGS AT HOME, OR GET ALONG WITH OTHER PEOPLE: SOMEWHAT DIFFICULT

## 2018-10-04 ASSESSMENT — PATIENT HEALTH QUESTIONNAIRE - PHQ9: 5. POOR APPETITE OR OVEREATING: SEVERAL DAYS

## 2018-10-04 NOTE — PROGRESS NOTES
"SUBJECTIVE:   Sofie Padilla is a 22 year old female who presents to clinic today with self because of:    Chief Complaint   Patient presents with     Referral     therapist        HPI  Mental Health Follow-up Visit for Depression / Anxiety    How is your mood today? ok    Change in symptoms since last visit: same    New symptoms since last visit:  none    Problems taking medications: No    Who else is on your mental health care team? Primary Care Provider and currently no one else    +++++++++++++++++++++++++++++++++++++++++++++++++++++++++++++++    PHQ 8/14/2018 8/30/2018 10/4/2018   PHQ-9 Total Score 9 15 3   Q9: Suicide Ideation Not at all Several days Not at all     SALOMÓN-7 SCORE 2/1/2018 8/30/2018 10/4/2018   Total Score - - -   Total Score 2 0 3         Home and School     Have there been any big changes at home? Yes-  Moved back from [place]    Are you having challenges at school?   No - not currently in school  Social Supports:     Friend(s) Sister    family  Sleep:    Hours of sleep on a school night: 8-10 hours  Substance abuse:    None  Maladaptive coping strategies:    None      Suicide Assessment Five-step Evaluation and Treatment (SAFE-T)    Has been \"at a standstill\" since graduation.  Afraid of rejection and failure so has passed up opportunities at work and for school. Wants to go to college, but hasn't applied because wants to avoid rejection. Was offered a promotion at work, but didn't take it, and she isn't sure why because she knows she could do it.  Loved being in [place] with her sister, but missed home so decided to move back.  Wants to work on a better lasting relationship with dad, self, some siblings. already has had improving relationship with mom.  Saw therapist before, but didn't really talk or open up to her. Wasn't sure at the time that it would help. Knew that it should be helpful, but wasn't sure how to make it work for her. Now is interested in seeing a therapist again   "   ROS  Constitutional, eye, ENT, skin, respiratory, cardiac, and GI are normal except as otherwise noted.    PROBLEM LIST  Patient Active Problem List    Diagnosis Date Noted     Headache, unspecified headache type 08/30/2018     Priority: Medium     Pre-syncope 08/30/2018     Priority: Medium     Anxiety 02/24/2016     Priority: Medium     Major depressive disorder, recurrent episode, moderate (H) 02/24/2016     Priority: Medium     Had been on citalopram since 6/2010. Stopped on own 12/2016 because thought was doing well, but had recurrence of symptoms. Restarted medication 3/31/2017.       History of varicella as a child 07/08/2014     Priority: Medium     Verruca plantaris 10/25/2012     Priority: Medium     Keratosis pilaris 06/23/2011     Priority: Medium     ADHD (attention deficit hyperactivity disorder) 04/14/2011     Priority: Medium     Adderall XR 3/2011 - 7/2012  Changed to Metadate ER because Adderall XR didn't seem to be working well enough. Concerta hadn't been covered by insurance in the past.  Didn't work any better so tried Vyvanse 10/2012  By 10/2014, didn't seem adequate enough so started trial of Concerta.       Adjustment disorder with disturbance of conduct 02/13/2011     Priority: Medium     Diagnosed at [facility] 1/18/11       Allergic rhinitis 11/02/2010     Priority: Medium     Mild major depression (H) 06/23/2010     Priority: Medium     On Celexa 10 mg since 6/2010        MEDICATIONS  Current Outpatient Prescriptions   Medication Sig Dispense Refill     citalopram (CELEXA) 10 MG tablet TAKE 1 TABLET(10 MG) BY MOUTH DAILY 90 tablet 0     lisdexamfetamine (VYVANSE) 70 MG capsule Take 1 capsule (70 mg) by mouth daily 30 capsule 0     mupirocin (BACTROBAN NASAL) 2 % nasal ointment Apply to affected area 3 times/day for 5-7 days 22 g 0      ALLERGIES  Allergies   Allergen Reactions     Nkda [No Known Drug Allergies]        Reviewed and updated as needed this visit by clinical  "staff  Tobacco  Allergies  Meds  Med Hx  Surg Hx  Fam Hx  Soc Hx        Reviewed and updated as needed this visit by Provider       OBJECTIVE:     /85  Pulse 104  Temp 98.8  F (37.1  C) (Oral)  Resp 18  Ht 5' 2.5\" (1.588 m)  Wt 134 lb (60.8 kg)  LMP 10/01/2018  SpO2 97%  Breastfeeding? No  BMI 24.12 kg/m2    GENERAL:  Alert and interactive.  PSYCH: well groomed, normal mood and affect. Speech - appropriate with normal rate and volume.    DIAGNOSTICS: None    ASSESSMENT/PLAN:   (F32.0) Mild major depression (H)  (primary encounter diagnosis)  (F41.9) Anxiety  Comment: doing ok on citalopram, but wants to start seeing a therapist as well  Plan: MENTAL HEALTH REFERRAL  - Adult; Outpatient         Treatment; Individual/Couples/Family/Group         Therapy/Health Psychology; Other: Not Listed -         Enter Referral Details in Scheduling Comments         Below        Continue medication, referral made to restart therapy    (Z23) Need for prophylactic vaccination and inoculation against influenza  Plan: FLU VACCINE, SPLIT VIRUS, IM (QUADRIVALENT)         [16025]- >3 YRS, Vaccine Administration,         Initial [16319]      FOLLOW UP: in 2 month(s), sooner if needed    Eliazar Dumont MD     >50% of the 40 minute appointment was spent with counseling and education and/or coordinating care with regards to above problem(s).       "

## 2018-10-04 NOTE — PATIENT INSTRUCTIONS
[facility]    If you have any questions regarding to your visit please contact your care team:       Team Red:   Clinic Hours Telephone Number   Dr. Graciela De Leon NP 7am-7pm  Monday - Thursday   7am-5pm  Fridays  [phone]  (Appointment scheduling available 24/7)   Urgent Care - [place] and [facility] - 11am-9pm Monday-Friday Saturday-Sunday- 9am-5pm   [place] - 5pm-9pm Monday-Friday Saturday-Sunday- 9am-5pm  [phone] - [place]  [phone] - [place]       What options do I have for a visit other than an office visit? We offer electronic visits (e-visits) and telephone visits, when medically appropriate.  Please check with your medical insurance to see if these types of visits are covered, as you will be responsible for any charges that are not paid by your insurance.      You can use [employer] (secure electronic communication) to access to your chart, send your primary care provider a message, or make an appointment. Ask a team member how to get started.     For a price quote for your services, please call our Consumer Price Line at [phone] or our Imaging Cost estimation line at [phone] (for imaging tests).    Mary Ann Maldonado MA

## 2018-10-04 NOTE — PROGRESS NOTES

## 2018-10-04 NOTE — MR AVS SNAPSHOT
After Visit Summary   10/4/2018    Sofie Padilla    MRN: [id number]           Patient Information     Date Of Birth          1995        Visit Information        Provider Department      10/4/2018 2:20 PM Eliazar Dumont MD [facility]        Today's Diagnoses     Mild major depression (H)    -  1    Anxiety          Care Instructions    [facility]    If you have any questions regarding to your visit please contact your care team:       Team Red:   Clinic Hours Telephone Number   Dr. Graciela De Leon NP 7am-7pm  Monday - Thursday   7am-5pm  Fridays  [phone]  (Appointment scheduling available 24/7)   Urgent Care - [place] and [facility] - 11am-9pm Monday-Friday Saturday-Sunday- 9am-5pm   [place] - 5pm-9pm Monday-Friday Saturday-Sunday- 9am-5pm  [phone] - [place]  [phone] - [place]       What options do I have for a visit other than an office visit? We offer electronic visits (e-visits) and telephone visits, when medically appropriate.  Please check with your medical insurance to see if these types of visits are covered, as you will be responsible for any charges that are not paid by your insurance.      You can use [employer] (secure electronic communication) to access to your chart, send your primary care provider a message, or make an appointment. Ask a team member how to get started.     For a price quote for your services, please call our Consumer Price Line at [phone] or our Imaging Cost estimation line at [phone] (for imaging tests).    Mary Ann Maldonado MA            Follow-ups after your visit        Additional Services     MENTAL HEALTH REFERRAL  - Adult; Outpatient Treatment; Individual/Couples/Family/Group Therapy/Health Psychology; Other: Not Listed - Enter Referral Details in Scheduling Comments Below       All scheduling is subject to the client's  "specific insurance plan & benefits, provider/location availability, and provider clinical specialities.  Please arrive 15 minutes early for your first appointment and bring your completed paperwork.    Please be aware that coverage of these services is subject to the terms and limitations of your health insurance plan.  Call member services at your health plan with any benefit or coverage questions.                  Who to contact     If you have questions or need follow up information about today's clinic visit or your schedule please contact [facility] directly at [phone].  Normal or non-critical lab and imaging results will be communicated to you by MyChart, letter or phone within 4 business days after the clinic has received the results. If you do not hear from us within 7 days, please contact the clinic through MyChart or phone. If you have a critical or abnormal lab result, we will notify you by phone as soon as possible.  Submit refill requests through [employer] or call your pharmacy and they will forward the refill request to us. Please allow 3 business days for your refill to be completed.          Additional Information About Your Visit        Care EveryWhere ID     This is your Care EveryWhere ID. This could be used by other organizations to access your [facility] medical records  [id number]        Your Vitals Were     Pulse Temperature Respirations Height Last Period Pulse Oximetry    104 98.8  F (37.1  C) (Oral) 18 5' 2.5\" (1.588 m) 10/01/2018 97%    Breastfeeding? BMI (Body Mass Index)                No 24.12 kg/m2           Blood Pressure from Last 3 Encounters:   10/04/18 140/85   08/30/18 120/68   06/22/18 118/75    Weight from Last 3 Encounters:   10/04/18 134 lb (60.8 kg)   08/30/18 135 lb (61.2 kg)   06/22/18 136 lb (61.7 kg)              We Performed the Following     MENTAL HEALTH REFERRAL  - Adult; Outpatient Treatment; Individual/Couples/Family/Group Therapy/Health " Psychology; Other: Not Listed - Enter Referral Details in Scheduling Comments Below        Primary Care Provider Office Phone # Fax #    Eliazar Maribel Dumont -[phone]       [address] [address]        Equal Access to Services     [facility]: Hadii aad ku hadasho Soomaali, waaxda luqadaha, qaybta kaalmada adeegyada, waxay idiin hayaan adeeg khnilsonsh laeldern . So [facility] [phone].    ATENCIÓN: Si habla español, tiene a noyola disposición servicios gratuitos de asistencia lingüística. [facility] [phone].    We comply with applicable federal civil rights laws and [place] laws. We do not discriminate on the basis of race, color, national origin, age, disability, sex, sexual orientation, or gender identity.            Thank you!     Thank you for choosing [facility]  for your care. Our goal is always to provide you with excellent care. Hearing back from our patients is one way we can continue to improve our services. Please take a few minutes to complete the written survey that you may receive in the mail after your visit with us. Thank you!             Your Updated Medication List - Protect others around you: Learn how to safely use, store and throw away your medicines at www.disposemymeds.org.          This list is accurate as of 10/4/18  3:21 PM.  Always use your most recent med list.                   Brand Name Dispense Instructions for use Diagnosis    citalopram 10 MG tablet    celeXA    90 tablet    TAKE 1 TABLET(10 MG) BY MOUTH DAILY    Major depressive disorder, recurrent, in remission, unspecified (H), Anxiety       lisdexamfetamine 70 MG capsule    VYVANSE    30 capsule    Take 1 capsule (70 mg) by mouth daily    Attention deficit hyperactivity disorder (ADHD), combined type       mupirocin 2 % nasal ointment    BACTROBAN NASAL    22 g    Apply to affected area 3 times/day for 5-7 days    Impetigo

## 2018-10-05 ASSESSMENT — PATIENT HEALTH QUESTIONNAIRE - PHQ9: SUM OF ALL RESPONSES TO PHQ QUESTIONS 1-9: 3

## 2018-10-05 ASSESSMENT — ANXIETY QUESTIONNAIRES: GAD7 TOTAL SCORE: 3

## 2018-10-16 ENCOUNTER — TELEPHONE (OUTPATIENT)
Dept: FAMILY MEDICINE | Facility: CLINIC | Age: 23
End: 2018-10-16

## 2018-10-16 NOTE — LETTER
October 19, 2018    Sofie Padilla  [address] [facility] [address]      Dear Sofie,    We have been unsuccessful reaching you by telephone regarding your results. Your heart monitor is normal. Please follow up with Dr. Dumont to discuss next steps in evaluating your fainting episodes. If you have any questions or concerns, please call myself or my nurse at [phone].      Sincerely,        Abbey De Leon CNP/dt

## 2018-10-16 NOTE — TELEPHONE ENCOUNTER
Left message for patient to call RN hotline [phone].     Notes Recorded by Ella Jordan RN on 10/16/2018 at 2:16 PM  Left message for patient to call RN hotline [phone].   See TE.    Ella Jordan RN  ------    Notes Recorded by Abbey De Leon APRN CNP on 10/16/2018 at 2:08 PM  Please call the patient with these results:    Sofie, your heart monitor is normal. Please follow up with Dr. Dumont to discuss next steps in evaluating your fainting episodes.     Abbey De Leon, ELIN Jordan RN

## 2018-10-16 NOTE — PROGRESS NOTES
Please call the patient with these results:    Sofie, your heart monitor is normal. Please follow up with Dr. Dumont to discuss next steps in evaluating your fainting episodes.     Abbey De Leon, CNP

## 2018-11-20 NOTE — PATIENT INSTRUCTIONS
[facility]    If you have any questions regarding to your visit please contact your care team:       Team Red:   Clinic Hours Telephone Number   Dr. Graciela De Leon, NP 7am-7pm  Monday - Thursday   7am-5pm  Fridays  [phone]  (Appointment scheduling available 24/7)   Urgent [facility] - [place] [facility] - 11am-9pm Monday-Friday Saturday-Sunday- 9am-5pm   [place] - 5pm-9pm Monday-Friday Saturday-Sunday- 9am-5pm  [phone] - [place]  [phone] - [place]       What options do I have for a visit other than an office visit? We offer electronic visits (e-visits) and telephone visits, when medically appropriate.  Please check with your medical insurance to see if these types of visits are covered, as you will be responsible for any charges that are not paid by your insurance.      You can use [employer] (secure electronic communication) to access to your chart, send your primary care provider a message, or make an appointment. Ask a team member how to get started.     For a price quote for your services, please call our Consumer Price Line at [phone] or our Imaging Cost estimation line at [phone] (for imaging tests).  Thu RICHARDS MA                       Smoking: A Major Threat to Health   Smoking and health   Smoking is the number one health hazard in our country. And as smokers grow older, they are more likely to:   Suffer crippling poor health.   Die earlier than nonsmokers.   More and more people have quit smoking. Studies show that even if you are a heavy smoker, it can still help you to quit.   Smoking and cancer   Smoking is the main cause of lung cancer. The earlier you start smoking and the more you smoke each day, the higher your chances of getting lung cancer. It happens most often when people are between 60 and 70 years of age. Smoking can also lead to cancers of the:   Mouth and lip.   Throat and windpipe.    Kidney and bladder.   Pancreas and stomach.   Smoking and lung disease   Chronic bronchitis (inflammation of air tubes) and emphysema (damage to lung tissue) are both caused by smoking. These are both forms of lung disease. People often have both at the same time. Lung disease is a common cause of pain and death in older people.   Smoking and heart disease   Smoking greatly increases your chance of getting heart disease. The more you smoke, the greater your chances of heart disease. Smoking narrows the arteries that carry blood to your heart muscle. They are more likely to become blocked and cause heart disease.   Smoking and stroke   Arteries to the brain can also become narrowed and blocked in people who smoke. A stroke happens when the blood supply to part of the brain is cut off. Strokes often cause death. Even if the stroke is not fatal, it may leave you unable to care for yourself.   Smoking and other blood flow problems   Smoking can narrow other arteries, cutting off the supply of oxygen and other things your body needs. If it happens in your legs, you may have pain when you walk. It could cause sores on your skin and gangrene (tissue death). Sometimes the foot or leg has to be removed.   Smoking also makes your blood pressure higher. This can damage your kidneys. High blood pressure also gives you a higher chance of heart failure, heart attacks, and stroke.   Smoking increases your chances of complications and slow healing if you have to have surgery.   There are no good reasons to smoke.   It's a great idea to quit, no matter what your age.   There are programs and medicines to help you quit.     Published by [employer].  This content is reviewed periodically and is subject to change as new health information becomes available. The information is intended to inform and educate and is not a replacement for medical evaluation, advice, diagnosis or treatment by a healthcare professional.   Developed by Estephanie  MD Dionisio, for [employer].   ? 2010 [facility] and/or its affiliates. All Rights Reserved.   Copyright   [employer] 2011

## 2018-11-20 NOTE — PROGRESS NOTES
"  SUBJECTIVE:   Sofie Padilla is a 23 year old female smoker who presents to clinic today for the following health issues:    Depression and Anxiety Follow-Up    Status since last visit: Improved     Other associated symptoms: ADHD - irritability and hyperfocus, some low appetite     Complicating factors:     Significant life event: No     Current substance abuse: None    PHQ 8/30/2018 10/4/2018 11/21/2018   PHQ-9 Total Score 15 3 2   Q9: Suicide Ideation Several days Not at all Not at all     SALOMÓN-7 SCORE 8/30/2018 10/4/2018 11/21/2018   Total Score - - -   Total Score 0 3 4       PHQ-9  English  PHQ-9   Any Language  SALOMÓN-7  Suicide Assessment Five-step Evaluation and Treatment (SAFE-T)    Amount of exercise or physical activity: 6-7 days/week for an average of greater than 60 minutes    Problems taking medications regularly: No    Medication side effects: decreased appetite    Diet: regular (no restrictions)    I have reviewed the patient's medical history in detail and updated the computerized patient record.     ROS:  CONSTITUTIONAL: NEGATIVE for change in weight  PSYCHIATRIC: HX anxiety and HX depression    OBJECTIVE:     /60  Pulse 98  Temp 96.8  F (36  C) (Oral)  Resp 14  Ht 5' 2.5\" (1.588 m)  Wt 133 lb (60.3 kg)  LMP 11/07/2018  SpO2 98%  Breastfeeding? No  BMI 23.94 kg/m2  Body mass index is 23.94 kg/(m^2).  GENERAL: healthy, alert and no distress  MS: extremities normal- no gross deformities noted  NEURO: mentation intact  PSYCH: affect flat and anxious    Diagnostic Test Results:  none     ASSESSMENT/PLAN:   (F90.2) Attention deficit hyperactivity disorder (ADHD), combined type  (primary encounter diagnosis)  Plan: lisdexamfetamine (VYVANSE) 50 MG capsule,         lisdexamfetamine (VYVANSE) 50 MG capsule,         lisdexamfetamine (VYVANSE) 50 MG capsule        Try lower dose to reduce some irritability side effects; No further refills until follow-up appointment     (F33.40) Major " depressive disorder, recurrent, in remission, unspecified (H)  (F41.9) Anxiety  Comment: Well controlled with medications without side effects.   Plan: citalopram (CELEXA) 10 MG tablet          (Z72.0) Tobacco abuse  Plan: Urged cessation and offered my support.       See Patient Instructions    Graciela Munoz MD  [facility]

## 2018-11-21 ENCOUNTER — OFFICE VISIT (OUTPATIENT)
Dept: FAMILY MEDICINE | Facility: CLINIC | Age: 23
End: 2018-11-21
Payer: COMMERCIAL

## 2018-11-21 VITALS
HEART RATE: 98 BPM | BODY MASS INDEX: 23.57 KG/M2 | HEIGHT: 63 IN | SYSTOLIC BLOOD PRESSURE: 128 MMHG | RESPIRATION RATE: 14 BRPM | WEIGHT: 133 LBS | DIASTOLIC BLOOD PRESSURE: 60 MMHG | TEMPERATURE: 96.8 F | OXYGEN SATURATION: 98 %

## 2018-11-21 DIAGNOSIS — F41.9 ANXIETY: ICD-10-CM

## 2018-11-21 DIAGNOSIS — F90.2 ATTENTION DEFICIT HYPERACTIVITY DISORDER (ADHD), COMBINED TYPE: Primary | ICD-10-CM

## 2018-11-21 DIAGNOSIS — F33.40 MAJOR DEPRESSIVE DISORDER, RECURRENT, IN REMISSION, UNSPECIFIED (H): ICD-10-CM

## 2018-11-21 DIAGNOSIS — Z72.0 TOBACCO ABUSE: ICD-10-CM

## 2018-11-21 PROBLEM — R55 PRE-SYNCOPE: Status: RESOLVED | Noted: 2018-08-30 | Resolved: 2018-11-21

## 2018-11-21 PROBLEM — R51.9 HEADACHE, UNSPECIFIED HEADACHE TYPE: Status: RESOLVED | Noted: 2018-08-30 | Resolved: 2018-11-21

## 2018-11-21 PROCEDURE — 99213 OFFICE O/P EST LOW 20 MIN: CPT | Performed by: FAMILY MEDICINE

## 2018-11-21 RX ORDER — LISDEXAMFETAMINE DIMESYLATE 70 MG/1
70 CAPSULE ORAL DAILY
Qty: 30 CAPSULE | Refills: 0 | Status: CANCELLED | OUTPATIENT
Start: 2018-11-21

## 2018-11-21 RX ORDER — LISDEXAMFETAMINE DIMESYLATE 50 MG/1
50 CAPSULE ORAL DAILY
Qty: 30 CAPSULE | Refills: 0 | Status: SHIPPED | OUTPATIENT
Start: 2018-11-21 | End: 2018-12-21

## 2018-11-21 RX ORDER — CITALOPRAM HYDROBROMIDE 10 MG/1
TABLET ORAL
Qty: 90 TABLET | Refills: 0 | Status: SHIPPED | OUTPATIENT
Start: 2018-11-21 | End: 2019-06-10

## 2018-11-21 RX ORDER — LISDEXAMFETAMINE DIMESYLATE 50 MG/1
50 CAPSULE ORAL DAILY
Qty: 30 CAPSULE | Refills: 0 | Status: SHIPPED | OUTPATIENT
Start: 2019-01-22 | End: 2019-07-29

## 2018-11-21 RX ORDER — LISDEXAMFETAMINE DIMESYLATE 50 MG/1
50 CAPSULE ORAL DAILY
Qty: 30 CAPSULE | Refills: 0 | Status: SHIPPED | OUTPATIENT
Start: 2018-12-22 | End: 2019-01-21

## 2018-11-21 ASSESSMENT — ANXIETY QUESTIONNAIRES
6. BECOMING EASILY ANNOYED OR IRRITABLE: NOT AT ALL
3. WORRYING TOO MUCH ABOUT DIFFERENT THINGS: NOT AT ALL
2. NOT BEING ABLE TO STOP OR CONTROL WORRYING: SEVERAL DAYS
5. BEING SO RESTLESS THAT IT IS HARD TO SIT STILL: SEVERAL DAYS
7. FEELING AFRAID AS IF SOMETHING AWFUL MIGHT HAPPEN: NOT AT ALL
IF YOU CHECKED OFF ANY PROBLEMS ON THIS QUESTIONNAIRE, HOW DIFFICULT HAVE THESE PROBLEMS MADE IT FOR YOU TO DO YOUR WORK, TAKE CARE OF THINGS AT HOME, OR GET ALONG WITH OTHER PEOPLE: SOMEWHAT DIFFICULT
GAD7 TOTAL SCORE: 4
1. FEELING NERVOUS, ANXIOUS, OR ON EDGE: SEVERAL DAYS

## 2018-11-21 ASSESSMENT — PATIENT HEALTH QUESTIONNAIRE - PHQ9
5. POOR APPETITE OR OVEREATING: SEVERAL DAYS
SUM OF ALL RESPONSES TO PHQ QUESTIONS 1-9: 2

## 2018-11-21 NOTE — LETTER
My Depression Action Plan  Name: Sofie Padilla   Date of Birth 1995  Date: 11/21/2018    My doctor: Graciela Munoz   My clinic: [address]  [place] [address]  [phone]          GREEN    ZONE   Good Control    What it looks like:     Things are going generally well. You have normal up s and down s. You may even feel depressed from time to time, but bad moods usually last less than a day.   What you need to do:  1. Continue to care for yourself (see self care plan)  2. Check your depression survival kit and update it as needed  3. Follow your physician s recommendations including any medication.  4. Do not stop taking medication unless you consult with your physician first.           YELLOW         ZONE Getting Worse    What it looks like:     Depression is starting to interfere with your life.     It may be hard to get out of bed; you may be starting to isolate yourself from others.    Symptoms of depression are starting to last most all day and this has happened for several days.     You may have suicidal thoughts but they are not constant.   What you need to do:     1. Call your care team, your response to treatment will improve if you keep your care team informed of your progress. Yellow periods are signs an adjustment may need to be made.     2. Continue your self-care, even if you have to fake it!    3. Talk to someone in your support network    4. Open up your depression survival kit           RED    ZONE Medical Alert - Get Help    What it looks like:     Depression is seriously interfering with your life.     You may experience these or other symptoms: You can t get out of bed most days, can t work or engage in other necessary activities, you have trouble taking care of basic hygiene, or basic responsibilities, thoughts of suicide or death that will not go away, self-injurious behavior.     What you need to do:  1. Call your care team and  request a same-day appointment. If they are not available (weekends or after hours) call your local crisis line, emergency room or 911.            Depression Self Care Plan / Survival Kit    Self-Care for Depression  Here s the deal. Your body and mind are really not as separate as most people think.  What you do and think affects how you feel and how you feel influences what you do and think. This means if you do things that people who feel good do, it will help you feel better.  Sometimes this is all it takes.  There is also a place for medication and therapy depending on how severe your depression is, so be sure to consult with your medical provider and/ or Behavioral Health Consultant if your symptoms are worsening or not improving.     In order to better manage my stress, I will:    Exercise  Get some form of exercise, every day. This will help reduce pain and release endorphins, the  feel good  chemicals in your brain. This is almost as good as taking antidepressants!  This is not the same as joining a gym and then never going! (they count on that by the way ) It can be as simple as just going for a walk or doing some gardening, anything that will get you moving.      Hygiene   Maintain good hygiene (Get out of bed in the morning, Make your bed, Brush your teeth, Take a shower, and Get dressed like you were going to work, even if you are unemployed).  If your clothes don't fit try to get ones that do.    Diet  I will strive to eat foods that are good for me, drink plenty of water, and avoid excessive sugar, caffeine, alcohol, and other mood-altering substances.  Some foods that are helpful in depression are: complex carbohydrates, B vitamins, flaxseed, fish or fish oil, fresh fruits and vegetables.    Psychotherapy  I agree to participate in Individual Therapy (if recommended).    Medication  If prescribed medications, I agree to take them.  Missing doses can result in serious side effects.  I understand that  drinking alcohol, or other illicit drug use, may cause potential side effects.  I will not stop my medication abruptly without first discussing it with my provider.    Staying Connected With Others  I will stay in touch with my friends, family members, and my primary care provider/team.    Use your imagination  Be creative.  We all have a creative side; it doesn t matter if it s oil painting, sand castles, or mud pies! This will also kick up the endorphins.    Witness Beauty  (AKA stop and smell the roses) Take a look outside, even in mid-winter. Notice colors, textures. Watch the squirrels and birds.     Service to others  Be of service to others.  There is always someone else in need.  By helping others we can  get out of ourselves  and remember the really important things.  This also provides opportunities for practicing all the other parts of the program.    Humor  Laugh and be silly!  Adjust your TV habits for less news and crime-drama and more comedy.    Control your stress  Try breathing deep, massage therapy, biofeedback, and meditation. Find time to relax each day.     My support system    Clinic Contact:  Phone number:    Contact 1:  Phone number:    Contact 2:  Phone number:    Oriental orthodox/:  Phone number:    Therapist:  Phone number:    Local crisis center:    Phone number:    Other community support:  Phone number:

## 2018-11-21 NOTE — MR AVS SNAPSHOT
After Visit Summary   11/21/2018    Sofie Padilla    MRN: [id number]           Patient Information     Date Of Birth          1995        Visit Information        Provider Department      11/21/2018 2:40 PM Graciela Munoz MD [facility]        Today's Diagnoses     Attention deficit hyperactivity disorder (ADHD), combined type    -  1    Major depressive disorder, recurrent, in remission, unspecified (H)        Anxiety        Tobacco abuse          Care Instructions    [facility]    If you have any questions regarding to your visit please contact your care team:       Team Red:   Clinic Hours Telephone Number   Dr. Graciela De Leon, NP 7am-7pm  Monday - Thursday   7am-5pm  Fridays  [phone]  (Appointment scheduling available 24/7)   Urgent Care - [place] and [facility] - 11am-9pm Monday-Friday Saturday-Sunday- 9am-5pm   [place] - 5pm-9pm Monday-Friday Saturday-Sunday- 9am-5pm  [phone] - [place]  [phone] - [place]       What options do I have for a visit other than an office visit? We offer electronic visits (e-visits) and telephone visits, when medically appropriate.  Please check with your medical insurance to see if these types of visits are covered, as you will be responsible for any charges that are not paid by your insurance.      You can use [employer] (secure electronic communication) to access to your chart, send your primary care provider a message, or make an appointment. Ask a team member how to get started.     For a price quote for your services, please call our Consumer Price Line at [phone] or our Imaging Cost estimation line at [phone] (for imaging tests).  Thu RICHARDS MA                       Smoking: A Major Threat to Health   Smoking and health   Smoking is the number one health hazard in our country. And as smokers grow older, they are more likely to:    Suffer crippling poor health.   Die earlier than nonsmokers.   More and more people have quit smoking. Studies show that even if you are a heavy smoker, it can still help you to quit.   Smoking and cancer   Smoking is the main cause of lung cancer. The earlier you start smoking and the more you smoke each day, the higher your chances of getting lung cancer. It happens most often when people are between 60 and 70 years of age. Smoking can also lead to cancers of the:   Mouth and lip.   Throat and windpipe.   Kidney and bladder.   Pancreas and stomach.   Smoking and lung disease   Chronic bronchitis (inflammation of air tubes) and emphysema (damage to lung tissue) are both caused by smoking. These are both forms of lung disease. People often have both at the same time. Lung disease is a common cause of pain and death in older people.   Smoking and heart disease   Smoking greatly increases your chance of getting heart disease. The more you smoke, the greater your chances of heart disease. Smoking narrows the arteries that carry blood to your heart muscle. They are more likely to become blocked and cause heart disease.   Smoking and stroke   Arteries to the brain can also become narrowed and blocked in people who smoke. A stroke happens when the blood supply to part of the brain is cut off. Strokes often cause death. Even if the stroke is not fatal, it may leave you unable to care for yourself.   Smoking and other blood flow problems   Smoking can narrow other arteries, cutting off the supply of oxygen and other things your body needs. If it happens in your legs, you may have pain when you walk. It could cause sores on your skin and gangrene (tissue death). Sometimes the foot or leg has to be removed.   Smoking also makes your blood pressure higher. This can damage your kidneys. High blood pressure also gives you a higher chance of heart failure, heart attacks, and stroke.   Smoking increases your chances of  "complications and slow healing if you have to have surgery.   There are no good reasons to smoke.   It's a great idea to quit, no matter what your age.   There are programs and medicines to help you quit.     Published by [employer].  This content is reviewed periodically and is subject to change as new health information becomes available. The information is intended to inform and educate and is not a replacement for medical evaluation, advice, diagnosis or treatment by a healthcare professional.   Developed by Estephanie Nichole MD, for [employer].   ? 2010 [facility] and/or its affiliates. All Rights Reserved.   Copyright   [employer] 2011                Follow-ups after your visit        Follow-up notes from your care team     Return in about 3 months (around 2/21/2019) for physical (fasting labs up to one week prior).      Who to contact     If you have questions or need follow up information about today's clinic visit or your schedule please contact [facility] directly at [phone].  Normal or non-critical lab and imaging results will be communicated to you by MyChart, letter or phone within 4 business days after the clinic has received the results. If you do not hear from us within 7 days, please contact the clinic through [employer] or phone. If you have a critical or abnormal lab result, we will notify you by phone as soon as possible.  Submit refill requests through [employer] or call your pharmacy and they will forward the refill request to us. Please allow 3 business days for your refill to be completed.          Additional Information About Your Visit        Care EveryWhere ID     This is your Care EveryWhere ID. This could be used by other organizations to access your [facility] medical records  [id number]        Your Vitals Were     Pulse Temperature Respirations Height Last Period Pulse Oximetry    98 96.8  F (36  C) (Oral) 14 5' 2.5\" (1.588 m) 11/07/2018 98%    Breastfeeding? " BMI (Body Mass Index)                No 23.94 kg/m2           Blood Pressure from Last 3 Encounters:   11/21/18 128/60   10/04/18 140/85   08/30/18 120/68    Weight from Last 3 Encounters:   11/21/18 133 lb (60.3 kg)   10/04/18 134 lb (60.8 kg)   08/30/18 135 lb (61.2 kg)              Today, you had the following     No orders found for display         Today's Medication Changes          These changes are accurate as of 11/21/18  3:21 PM.  If you have any questions, ask your nurse or doctor.               These medicines have changed or have updated prescriptions.        Dose/Directions    * lisdexamfetamine 70 MG capsule   Commonly known as:  VYVANSE   This may have changed:  Another medication with the same name was added. Make sure you understand how and when to take each.   Used for:  Attention deficit hyperactivity disorder (ADHD), combined type   Changed by:  Graciela Munoz MD        Dose:  70 mg   Take 1 capsule (70 mg) by mouth daily   Quantity:  30 capsule   Refills:  0       * lisdexamfetamine 50 MG capsule   Commonly known as:  VYVANSE   This may have changed:  You were already taking a medication with the same name, and this prescription was added. Make sure you understand how and when to take each.   Used for:  Attention deficit hyperactivity disorder (ADHD), combined type   Changed by:  Graciela Munoz MD        Dose:  50 mg   Take 1 capsule (50 mg) by mouth daily   Quantity:  30 capsule   Refills:  0       * lisdexamfetamine 50 MG capsule   Commonly known as:  VYVANSE   This may have changed:  You were already taking a medication with the same name, and this prescription was added. Make sure you understand how and when to take each.   Used for:  Attention deficit hyperactivity disorder (ADHD), combined type   Changed by:  Graciela Munoz MD        Dose:  50 mg   Start taking on:  12/22/2018   Take 1 capsule (50 mg) by mouth daily   Quantity:  30 capsule   Refills:  0       * lisdexamfetamine 50 MG  capsule   Commonly known as:  VYVANSE   This may have changed:  You were already taking a medication with the same name, and this prescription was added. Make sure you understand how and when to take each.   Used for:  Attention deficit hyperactivity disorder (ADHD), combined type   Changed by:  Graciela Munoz MD        Dose:  50 mg   Start taking on:  1/22/2019   Take 1 capsule (50 mg) by mouth daily   Quantity:  30 capsule   Refills:  0       mupirocin 2 % nasal ointment   Commonly known as:  BACTROBAN NASAL   This may have changed:    - when to take this  - reasons to take this  - additional instructions   Used for:  Impetigo        Apply to affected area 3 times/day for 5-7 days   Quantity:  22 g   Refills:  0       * Notice:  This list has 4 medication(s) that are the same as other medications prescribed for you. Read the directions carefully, and ask your doctor or other care provider to review them with you.         Where to get your medicines      These medications were sent to [facility] [address] [place] [address] AT [facility] & [address] [address]     Phone:  [phone]     citalopram 10 MG tablet         Some of these will need a paper prescription and others can be bought over the counter.  Ask your nurse if you have questions.     Bring a paper prescription for each of these medications     lisdexamfetamine 50 MG capsule    lisdexamfetamine 50 MG capsule    lisdexamfetamine 50 MG capsule                Primary Care Provider Office Phone # Fax #    Graciela Munoz [phone] [address] [facility] [address]        Equal Access to Services     [facility]: Hadii mica ochoa hadgregory Somi, waaxda luqadaha, qaybta kaalmada tyler, marline munguia. So [facility] [phone].    ATENCIÓN: Si habla español, tiene a noyola disposición servicios gratuitos de asistencia  lingüísticaPastora Soto al [phone].    We comply with applicable federal civil rights laws and [place] laws. We do not discriminate on the basis of race, color, national origin, age, disability, sex, sexual orientation, or gender identity.            Thank you!     Thank you for choosing [facility]  for your [facility]. Our goal is always to provide you with excellent care. Hearing back from our patients is one way we can continue to improve our services. Please take a few minutes to complete the written survey that you may receive in the mail after your visit with us. Thank you!             Your Updated Medication List - Protect others around you: Learn how to safely use, store and throw away your medicines at www.disposemymeds.org.          This list is accurate as of 11/21/18  3:21 PM.  Always use your most recent med list.                   Brand Name Dispense Instructions for use Diagnosis    citalopram 10 MG tablet    celeXA    90 tablet    TAKE 1 TABLET(10 MG) BY MOUTH DAILY    Major depressive disorder, recurrent, in remission, unspecified (H), Anxiety       * lisdexamfetamine 70 MG capsule    VYVANSE    30 capsule    Take 1 capsule (70 mg) by mouth daily    Attention deficit hyperactivity disorder (ADHD), combined type       * lisdexamfetamine 50 MG capsule    VYVANSE    30 capsule    Take 1 capsule (50 mg) by mouth daily    Attention deficit hyperactivity disorder (ADHD), combined type       * lisdexamfetamine 50 MG capsule   Start taking on:  12/22/2018    VYVANSE    30 capsule    Take 1 capsule (50 mg) by mouth daily    Attention deficit hyperactivity disorder (ADHD), combined type       * lisdexamfetamine 50 MG capsule   Start taking on:  1/22/2019    VYVANSE    30 capsule    Take 1 capsule (50 mg) by mouth daily    Attention deficit hyperactivity disorder (ADHD), combined type       mupirocin 2 % nasal ointment    BACTROBAN NASAL    22 g    Apply to affected area 3 times/day for 5-7 days     Impetigo       * Notice:  This list has 4 medication(s) that are the same as other medications prescribed for you. Read the directions carefully, and ask your doctor or other care provider to review them with you.

## 2018-11-22 ASSESSMENT — ANXIETY QUESTIONNAIRES: GAD7 TOTAL SCORE: 4

## 2018-11-26 ENCOUNTER — TELEPHONE (OUTPATIENT)
Dept: FAMILY MEDICINE | Facility: CLINIC | Age: 23
End: 2018-11-26

## 2018-11-26 DIAGNOSIS — F90.2 ATTENTION DEFICIT HYPERACTIVITY DISORDER (ADHD), COMBINED TYPE: ICD-10-CM

## 2018-11-26 NOTE — TELEPHONE ENCOUNTER
PA Initiation    Medication: lisdexamfetamine (VYVANSE) 50 MG capsule  Insurance Company: [employer] - Phone [phone] Fax [phone]  [facility] [address] [place] [address] S AT [facility] & [address]  Filling Pharmacy Phone: [phone]  Filling Pharmacy Fax:    Start Date: 11/26/2018    Central Prior Authorization Team   Phone: [phone]

## 2018-11-26 NOTE — TELEPHONE ENCOUNTER
Ok for prior authorization for Vyvanse for ADHD.    Prior medications tried: adderall, metadate, and concerta was not as effective   Has been on this medication successfully since 2012  Graciela Munoz MD

## 2018-11-26 NOTE — TELEPHONE ENCOUNTER
Prior Authorization Retail Medication Request         Graciela Munoz MD Physician Signed    Date of Service: 11/26/2018 2:20 PM Creation Time: 11/26/2018 2:20 PM    Addend Delete  Bookmark Copy       Ok for prior authorization for Vyvanse for ADHD.    Prior medications tried: adderall, metadate, and concerta was not as effective   Has been on this medication successfully since 2012  Graciela Munoz MD                  Medication/Dose: lisdexamfetamine (VYVANSE) 50 MG capsule  ICD code (if different than what is on RX):  **  Previously Tried and Failed:  **  Rationale:  **    Insurance Name:  **  Insurance ID:  **      Pharmacy Information (if different than what is on RX)  Name:    [facility] [address] [place] [address] AT [facility] & [address] [address]  Phone: [phone] Fax: [phone]

## 2018-11-26 NOTE — TELEPHONE ENCOUNTER
Received fax from pharmacy: Plan does not cover this medication. Please consider alternative, thank you

## 2018-11-27 NOTE — TELEPHONE ENCOUNTER
Prior Authorization Approval    Authorization Effective Date: 11/21/2018  Authorization Expiration Date: 11/21/2019  Medication: lisdexamfetamine (VYVANSE) 50 MG capsule  Approved Dose/Quantity: 30  Reference #: [id number]   Insurance Company: [employer] - Phone [phone] Fax [phone]  Which Pharmacy is filling the prescription (Not needed for infusion/clinic administered): [facility] [address] [place] [address] RD S AT [facility]  Pharmacy Notified: Yes  Patient Notified: Yes

## 2018-11-28 RX ORDER — LISDEXAMFETAMINE DIMESYLATE 50 MG/1
50 CAPSULE ORAL DAILY
Qty: 30 CAPSULE | Refills: 0
Start: 2018-12-22

## 2019-05-10 ENCOUNTER — OFFICE VISIT (OUTPATIENT)
Dept: URGENT CARE | Facility: URGENT CARE | Age: 24
End: 2019-05-10
Payer: COMMERCIAL

## 2019-05-10 VITALS
BODY MASS INDEX: 24.91 KG/M2 | OXYGEN SATURATION: 97 % | HEART RATE: 64 BPM | SYSTOLIC BLOOD PRESSURE: 123 MMHG | TEMPERATURE: 97.8 F | DIASTOLIC BLOOD PRESSURE: 77 MMHG | WEIGHT: 138.4 LBS

## 2019-05-10 DIAGNOSIS — F43.23 ADJUSTMENT DISORDER WITH MIXED ANXIETY AND DEPRESSED MOOD: Primary | ICD-10-CM

## 2019-05-10 PROCEDURE — 99213 OFFICE O/P EST LOW 20 MIN: CPT | Performed by: PHYSICIAN ASSISTANT

## 2019-05-10 RX ORDER — CITALOPRAM HYDROBROMIDE 10 MG/1
10 TABLET ORAL DAILY
Qty: 30 TABLET | Refills: 0 | Status: SHIPPED | OUTPATIENT
Start: 2019-05-10 | End: 2019-06-10

## 2019-05-10 ASSESSMENT — ANXIETY QUESTIONNAIRES
IF YOU CHECKED OFF ANY PROBLEMS ON THIS QUESTIONNAIRE, HOW DIFFICULT HAVE THESE PROBLEMS MADE IT FOR YOU TO DO YOUR WORK, TAKE CARE OF THINGS AT HOME, OR GET ALONG WITH OTHER PEOPLE: VERY DIFFICULT
1. FEELING NERVOUS, ANXIOUS, OR ON EDGE: MORE THAN HALF THE DAYS
6. BECOMING EASILY ANNOYED OR IRRITABLE: NOT AT ALL
4. TROUBLE RELAXING: SEVERAL DAYS
GAD7 TOTAL SCORE: 12
3. WORRYING TOO MUCH ABOUT DIFFERENT THINGS: NEARLY EVERY DAY
7. FEELING AFRAID AS IF SOMETHING AWFUL MIGHT HAPPEN: NEARLY EVERY DAY
5. BEING SO RESTLESS THAT IT IS HARD TO SIT STILL: NOT AT ALL
2. NOT BEING ABLE TO STOP OR CONTROL WORRYING: NEARLY EVERY DAY

## 2019-05-10 ASSESSMENT — PATIENT HEALTH QUESTIONNAIRE - PHQ9: SUM OF ALL RESPONSES TO PHQ QUESTIONS 1-9: 12

## 2019-05-10 NOTE — PROGRESS NOTES
SUBJECTIVE:    Sofie Padilla is a 23 year old female who presents to the clinic for initial evaluation of anxiety and depression.  Her boyfriend of 4 months broke up with her 2 weeks ago.  1 minute he was talking about marriage and the next minute broke up.  She stopped her Celexa in January as she had insurance issues.  The Celexa did work well for her.    Previous history of anxiety: yes  Previous history of medication therapy: yes  Previous history of suicide attempt: no  Current thoughts of suicide or homicide: no    Medication side effects: no    Family history of anxiety: not asked    Current Outpatient Medications   Medication Sig Dispense Refill     citalopram (CELEXA) 10 MG tablet Take 1 tablet (10 mg) by mouth daily 30 tablet 0     citalopram (CELEXA) 10 MG tablet TAKE 1 TABLET(10 MG) BY MOUTH DAILY 90 tablet 0     lisdexamfetamine (VYVANSE) 70 MG capsule Take 1 capsule (70 mg) by mouth daily (Patient not taking: Reported on 5/10/2019) 30 capsule 0     mupirocin (BACTROBAN NASAL) 2 % nasal ointment Apply to affected area 3 times/day for 5-7 days (Patient taking differently: as needed Apply to affected area 3 times/day for 5-7 days) 22 g 0     Allergies   Allergen Reactions     Nkda [No Known Drug Allergies]        OBJECTIVE:  Blood pressure 123/77, pulse 64, temperature 97.8  F (36.6  C), temperature source Oral, weight 62.8 kg (138 lb 6.4 oz), SpO2 97 %, not currently breastfeeding.  GENERAL: alert and in no apparent distress  MENTAL STATUS FINDINGS:  denies homicidal or suicidal behavior  PHQ 9 score is 12.  SALOMÓN-7 score is 12.  And depression.    ASSESSMENT:    ICD-10-CM    1. Adjustment disorder with mixed anxiety and depressed mood F43.23 citalopram (CELEXA) 10 MG tablet         PLANS:  Offered counseling but she declines at this point.  Would like to restart the Celexa again.  We will follow-up with her primary in 1 month.  Seems rational today.  She denies any suicidal or homicidal ideation.         Mely Awad PA-C

## 2019-05-11 ASSESSMENT — ANXIETY QUESTIONNAIRES: GAD7 TOTAL SCORE: 12

## 2019-06-06 ENCOUNTER — TELEPHONE (OUTPATIENT)
Dept: URGENT CARE | Facility: URGENT CARE | Age: 24
End: 2019-06-06

## 2019-06-06 DIAGNOSIS — F33.40 MAJOR DEPRESSIVE DISORDER, RECURRENT, IN REMISSION, UNSPECIFIED (H): ICD-10-CM

## 2019-06-06 DIAGNOSIS — F41.9 ANXIETY: ICD-10-CM

## 2019-06-06 NOTE — TELEPHONE ENCOUNTER
"Requested Prescriptions   Pending Prescriptions Disp Refills     citalopram (CELEXA) 10 MG tablet  Last Written Prescription Date:  05//10/19  Last Fill Quantity: 30,  # refills: 0   Last Office Visit with [facility], [facility] or [facility] prescribing provider:  05/10/19-LILIA, 11/21/18-Tammy   Future Office Visit:    90 tablet 0     Sig: TAKE 1 TABLET(10 MG) BY MOUTH DAILY       SSRIs Protocol Failed - 6/6/2019 11:25 AM        Failed - PHQ-9 score less than 5 in past 6 months     Please review last PHQ-9 score.           Passed - Medication is active on med list        Passed - Patient is age 18 or older        Passed - No active pregnancy on record        Passed - No positive pregnancy test in last 12 months        Passed - Recent (6 mo) or future (30 days) visit within the authorizing provider's specialty     Patient had office visit in the last 6 months or has a visit in the next 30 days with authorizing provider or within the authorizing provider's specialty.  See \"Patient Info\" tab in inbasket, or \"Choose Columns\" in Meds & Orders section of the refill encounter.              "

## 2019-06-10 ENCOUNTER — TELEPHONE (OUTPATIENT)
Dept: FAMILY MEDICINE | Facility: CLINIC | Age: 24
End: 2019-06-10

## 2019-06-10 DIAGNOSIS — F33.40 MAJOR DEPRESSIVE DISORDER, RECURRENT, IN REMISSION, UNSPECIFIED (H): ICD-10-CM

## 2019-06-10 DIAGNOSIS — F41.9 ANXIETY: ICD-10-CM

## 2019-06-10 RX ORDER — CITALOPRAM HYDROBROMIDE 10 MG/1
TABLET ORAL
Qty: 30 TABLET | Refills: 0 | Status: CANCELLED | OUTPATIENT
Start: 2019-06-10

## 2019-06-10 RX ORDER — CITALOPRAM HYDROBROMIDE 10 MG/1
TABLET ORAL
Qty: 30 TABLET | Refills: 0 | Status: SHIPPED | OUTPATIENT
Start: 2019-06-10 | End: 2019-07-07

## 2019-06-10 NOTE — TELEPHONE ENCOUNTER
Medication is being filled for 1 time refill only due to:  Patient needs to be seen because due for physical and fasting labs.    Aline Sherman RN

## 2019-06-10 NOTE — TELEPHONE ENCOUNTER
Routing refill request to provider for review/approval because:  phq9 score elevated, see score below  Patient was last seen on 11/21/18 and advised to f/u around 2/21/19 for physical  Patient was also seen on 5/10/19 for depression at  and advised to f/u with PCP in 1month- no future visits scheduled.   PHQ-9 SCORE 10/4/2018 11/21/2018 5/10/2019   PHQ-9 Total Score - - -   PHQ-9 Total Score 3 2 12     Tess Kc RN

## 2019-06-10 NOTE — TELEPHONE ENCOUNTER
Reason for call:  Medication   If this is a refill request, has the caller requested the refill from the pharmacy already? Yes  Will the patient be using a [facility]? No  Name of the pharmacy and phone number for the current request: [facility]    Name of the medication requested: citalopram (CELEXA) 10 MG tablet    Other request: na    Phone number to reach patient:  Home number on file [phone] (home)    Best Time:  any    Can we leave a detailed message on this number?  YES

## 2019-07-07 DIAGNOSIS — F41.9 ANXIETY: ICD-10-CM

## 2019-07-07 DIAGNOSIS — F33.40 MAJOR DEPRESSIVE DISORDER, RECURRENT, IN REMISSION, UNSPECIFIED (H): ICD-10-CM

## 2019-07-08 RX ORDER — CITALOPRAM HYDROBROMIDE 10 MG/1
TABLET ORAL
Qty: 30 TABLET | Refills: 0 | Status: SHIPPED | OUTPATIENT
Start: 2019-07-08 | End: 2019-08-11

## 2019-07-08 NOTE — TELEPHONE ENCOUNTER
"Routing refill request to provider for review/approval because:  Labs out of range:  PHQ-9    Requested Prescriptions   Pending Prescriptions Disp Refills     citalopram (CELEXA) 10 MG tablet [Pharmacy Med Name: CITALOPRAM 10MG TABLETS] 30 tablet 0     Sig: TAKE 1 TABLET BY MOUTH EVERY DAY       SSRIs Protocol Failed - 7/8/2019  9:19 AM        Failed - PHQ-9 score less than 5 in past 6 months     Please review last PHQ-9 score.           Passed - Medication is active on med list        Passed - Patient is age 18 or older        Passed - No active pregnancy on record        Passed - No positive pregnancy test in last 12 months        Passed - Recent (6 mo) or future (30 days) visit within the authorizing provider's specialty     Patient had office visit in the last 6 months or has a visit in the next 30 days with authorizing provider or within the authorizing provider's specialty.  See \"Patient Info\" tab in inbasket, or \"Choose Columns\" in Meds & Orders section of the refill encounter.            Ella Jordan RN  "

## 2019-07-23 ENCOUNTER — TELEPHONE (OUTPATIENT)
Dept: FAMILY MEDICINE | Facility: CLINIC | Age: 24
End: 2019-07-23

## 2019-07-23 NOTE — TELEPHONE ENCOUNTER
Panel Management Review      Patient has the following on her problem list:     Depression / Dysthymia review    Measure:  Needs PHQ-9 score of 4 or less during index window.  Administer PHQ-9 and if score is 5 or more, send encounter to provider for next steps.    5 - 7 month window range:     PHQ-9 SCORE 11/21/2018 5/10/2019 7/29/2019   PHQ-9 Total Score - - -   PHQ-9 Total Score 2 12 5       If PHQ-9 recheck is 5 or more, route to provider for next steps.    Patient is due for:  None      Composite cancer screening  Chart review shows that this patient is due/due soon for the following Pap Smear  Summary:    Patient is due/failing the following:   LDL, PAP, PHQ9 and PHYSICAL    Action needed:   Patient needs office visit for Physical,PHQ9.    Type of outreach:    Sent letter.    Questions for provider review:    None                                                                                                                                    Thu RICHARDS MA

## 2019-07-23 NOTE — LETTER
July 23, 2019          Sofie Padilla,  [address] [facility] [address]        Dear Sofie Padilla      Monitoring and managing your preventative and chronic health conditions are very important to us. Our records indicate that you have not scheduled for Annual physical exam / PHQ9  which was recommended by Dr. Munoz      If you have received your health care elsewhere, please call the clinic so the information can be documented in your chart.    Please call [phone] or message us through your [employer] account to schedule an appointment or provide information for your chart.     Feel free to contact us if you have any questions or concerns!    I look forward to seeing you and working with you on your health care needs.     Sincerely,       [facility]/mlw    ;

## 2019-07-26 NOTE — PROGRESS NOTES
Subjective     Sofie Padilla is a 23 year old female who presents to clinic today for the following health issues:    HPI   Chief Complaint   Patient presents with     A.D.H.D     refill     Depression     with anxiety-would like to increase med.     ADHD Follow-Up    Date of last ADHD office visit: 11/21/2018  Status since last visit: Stable but would like to increase dose of Vyvanse  Taking controlled (daily) medications as prescribed: Yes                       Parent/Patient Concerns with Medications: None  ADHD Medication     Amphetamines Disp Start End     lisdexamfetamine (VYVANSE) 50 MG capsule    30 capsule 1/22/2019 7/29/2019    Sig - Route: Take 1 capsule (50 mg) by mouth daily - Oral    Class: Local Print    Earliest Fill Date: 1/19/2019     lisdexamfetamine (VYVANSE) 60 MG capsule    30 capsule 7/29/2019 8/28/2019    Sig - Route: Take 1 capsule (60 mg) by mouth daily - Oral    Class: Local Print    Earliest Fill Date: 7/29/2019     lisdexamfetamine (VYVANSE) 60 MG capsule    30 capsule 8/29/2019 9/28/2019    Sig - Route: Take 1 capsule (60 mg) by mouth daily - Oral    Class: Local Print    Earliest Fill Date: 8/26/2019     lisdexamfetamine (VYVANSE) 60 MG capsule    30 capsule 9/29/2019 10/29/2019    Sig - Route: Take 1 capsule (60 mg) by mouth daily - Oral    Class: Local Print    Earliest Fill Date: 9/26/2019        Sleep: no problems  Home/Family Concerns: Stable    Co-Morbid Diagnosis: Depression and Anxiety    Currently in counseling: No    Medication Benefits:   Controlled symptoms: Attention span, Distractability, Finishing tasks and Impulse control  Uncontrolled Symptoms: None    Medication side effects:  Side effects noted: palpitations  Denies: weight loss, insomnia, headache, emotional lability, rebound irritability, drowsiness and dry mouth  PROBLEMS TO ADD ON...    Depression and Anxiety Follow-Up   Celexa 10 mg helps with depression but not the anxiety.    How are you doing with your  depression since your last visit? No change    How are you doing with your anxiety since your last visit?  Worsened slightly    Are you having other symptoms that might be associated with depression or anxiety? No    Have you had a significant life event? No     Do you have any concerns with your use of alcohol or other drugs? No    Social History     Tobacco Use     Smoking status: Current Every Day Smoker     Packs/day: 0.20     Smokeless tobacco: Never Used     Tobacco comment:     Substance Use Topics     Alcohol use: No     Drug use: No     PHQ 10/4/2018 11/21/2018 5/10/2019   PHQ-9 Total Score 3 2 12   Q9: Thoughts of better off dead/self-harm past 2 weeks Not at all Not at all Not at all     SALOMÓN-7 SCORE 10/4/2018 11/21/2018 5/10/2019   Total Score - - -   Total Score 3 4 12     No flowsheet data found.  No flowsheet data found.      Suicide Assessment Five-step Evaluation and Treatment (SAFE-T)    Patient Active Problem List   Diagnosis     Allergic rhinitis     ADHD (attention deficit hyperactivity disorder)     Anxiety     Major depressive disorder, recurrent episode, moderate (H)     Tobacco abuse     Past Surgical History:   Procedure Laterality Date     PE TUBES  age 3     TYMPANOPLASTY  3/19/2013    Procedure: TYMPANOPLASTY;  Right fat graft tympanoplasty;  Surgeon: Jersey Ricardo MD;  Location:  OR       Social History     Tobacco Use     Smoking status: Current Every Day Smoker     Packs/day: 0.20     Smokeless tobacco: Never Used     Tobacco comment:     Substance Use Topics     Alcohol use: No     Family History   Problem Relation Age of Onset     C.A.D. Father      Diabetes Father         Pre-diabetes     Arthritis Maternal Grandmother      Cardiovascular Maternal Grandmother         heart disease     Alzheimer Disease Paternal Grandfather      Hypertension Paternal Grandfather      Asthma Brother      Depression Mother      Depression Maternal Grandfather      Heart Disease Maternal Aunt          enlarged heart     Heart Disease Maternal Aunt         enlarged heart     Cancer Maternal Aunt      Heart Disease Maternal Uncle         enlarged heart     Gastrointestinal Disease Maternal Uncle         colitis     Cerebrovascular Disease No family hx of      Thyroid Disease No family hx of      Glaucoma No family hx of      Macular Degeneration No family hx of          Review of Systems    Constitutional, HEENT, cardiovascular, pulmonary, gi and gu systems are negative, except as otherwise noted.      Objective    /70   Pulse 104   Temp 98.8  F (37.1  C) (Oral)   Wt 65.3 kg (144 lb)   SpO2 99%   BMI 25.92 kg/m    Body mass index is 25.92 kg/m .  Physical Exam   GENERAL: healthy, alert and no distress  NECK: no adenopathy and thyroid normal to palpation  RESP: lungs clear to auscultation - no rales, rhonchi or wheezes  CV: regular rate and rhythm, normal S1 S2, no S3 or S4, no murmur, click or rub  MS: no gross musculoskeletal defects noted, no edema  PSYCH: Alert, mentation and speech normal, affect normal.    Diagnostic Test Results:  Labs reviewed in Epic        Assessment & Plan     Sofie was seen today for a.d.h.d and depression.    Diagnoses and all orders for this visit:    Attention deficit hyperactivity disorder (ADHD), unspecified ADHD type    Will increase dose of Vyvanse to 60 mg  -     lisdexamfetamine (VYVANSE) 60 MG capsule; Take 1 capsule (60 mg) by mouth daily  -     lisdexamfetamine (VYVANSE) 60 MG capsule; Take 1 capsule (60 mg) by mouth daily  -     lisdexamfetamine (VYVANSE) 60 MG capsule; Take 1 capsule (60 mg) by mouth daily    Major depressive disorder, recurrent, in remission, unspecified (H)     Discussed evaluation by mental health down the road, as symptoms of her conditions overlap a lot and review of diagnoses and medications by specialist may be helpful        -     Celexa 10 mg     Anxiety        -     Celexa    Tobacco Cessation:   reports that she has been  smoking.  She has been smoking about 0.20 packs per day. She has never used smokeless tobacco.  Tobacco Cessation Action Plan: Self help information given to patient     Return in about 3 months (around 10/29/2019) for Routine Visit.    Ezio Ortiz MD  [facility]

## 2019-07-29 ENCOUNTER — OFFICE VISIT (OUTPATIENT)
Dept: FAMILY MEDICINE | Facility: CLINIC | Age: 24
End: 2019-07-29
Payer: COMMERCIAL

## 2019-07-29 VITALS
SYSTOLIC BLOOD PRESSURE: 128 MMHG | DIASTOLIC BLOOD PRESSURE: 70 MMHG | WEIGHT: 144 LBS | OXYGEN SATURATION: 99 % | HEART RATE: 104 BPM | TEMPERATURE: 98.8 F | BODY MASS INDEX: 25.92 KG/M2

## 2019-07-29 DIAGNOSIS — F90.9 ATTENTION DEFICIT HYPERACTIVITY DISORDER (ADHD), UNSPECIFIED ADHD TYPE: Primary | ICD-10-CM

## 2019-07-29 DIAGNOSIS — F33.40 MAJOR DEPRESSIVE DISORDER, RECURRENT, IN REMISSION, UNSPECIFIED (H): ICD-10-CM

## 2019-07-29 DIAGNOSIS — F41.9 ANXIETY: ICD-10-CM

## 2019-07-29 PROCEDURE — 99214 OFFICE O/P EST MOD 30 MIN: CPT | Performed by: FAMILY MEDICINE

## 2019-07-29 RX ORDER — LISDEXAMFETAMINE DIMESYLATE 60 MG/1
60 CAPSULE ORAL DAILY
Qty: 30 CAPSULE | Refills: 0 | Status: SHIPPED | OUTPATIENT
Start: 2019-09-29 | End: 2019-10-29

## 2019-07-29 RX ORDER — LISDEXAMFETAMINE DIMESYLATE 60 MG/1
60 CAPSULE ORAL DAILY
Qty: 30 CAPSULE | Refills: 0 | Status: SHIPPED | OUTPATIENT
Start: 2019-08-29 | End: 2019-09-28

## 2019-07-29 RX ORDER — LISDEXAMFETAMINE DIMESYLATE 60 MG/1
60 CAPSULE ORAL DAILY
Qty: 30 CAPSULE | Refills: 0 | Status: SHIPPED | OUTPATIENT
Start: 2019-07-29 | End: 2019-11-05

## 2019-07-29 ASSESSMENT — ANXIETY QUESTIONNAIRES
5. BEING SO RESTLESS THAT IT IS HARD TO SIT STILL: NOT AT ALL
2. NOT BEING ABLE TO STOP OR CONTROL WORRYING: MORE THAN HALF THE DAYS
6. BECOMING EASILY ANNOYED OR IRRITABLE: SEVERAL DAYS
1. FEELING NERVOUS, ANXIOUS, OR ON EDGE: MORE THAN HALF THE DAYS
GAD7 TOTAL SCORE: 10
7. FEELING AFRAID AS IF SOMETHING AWFUL MIGHT HAPPEN: SEVERAL DAYS
3. WORRYING TOO MUCH ABOUT DIFFERENT THINGS: MORE THAN HALF THE DAYS
IF YOU CHECKED OFF ANY PROBLEMS ON THIS QUESTIONNAIRE, HOW DIFFICULT HAVE THESE PROBLEMS MADE IT FOR YOU TO DO YOUR WORK, TAKE CARE OF THINGS AT HOME, OR GET ALONG WITH OTHER PEOPLE: VERY DIFFICULT

## 2019-07-29 ASSESSMENT — PATIENT HEALTH QUESTIONNAIRE - PHQ9
5. POOR APPETITE OR OVEREATING: MORE THAN HALF THE DAYS
SUM OF ALL RESPONSES TO PHQ QUESTIONS 1-9: 5

## 2019-07-30 ASSESSMENT — ANXIETY QUESTIONNAIRES: GAD7 TOTAL SCORE: 10

## 2019-11-05 ENCOUNTER — OFFICE VISIT (OUTPATIENT)
Dept: FAMILY MEDICINE | Facility: CLINIC | Age: 24
End: 2019-11-05
Payer: COMMERCIAL

## 2019-11-05 VITALS
HEIGHT: 63 IN | WEIGHT: 140.6 LBS | BODY MASS INDEX: 24.91 KG/M2 | DIASTOLIC BLOOD PRESSURE: 60 MMHG | TEMPERATURE: 98.3 F | OXYGEN SATURATION: 98 % | SYSTOLIC BLOOD PRESSURE: 120 MMHG | HEART RATE: 113 BPM | RESPIRATION RATE: 16 BRPM

## 2019-11-05 DIAGNOSIS — F33.40 MAJOR DEPRESSIVE DISORDER, RECURRENT, IN REMISSION, UNSPECIFIED (H): Primary | ICD-10-CM

## 2019-11-05 DIAGNOSIS — Z23 NEED FOR PROPHYLACTIC VACCINATION AND INOCULATION AGAINST INFLUENZA: ICD-10-CM

## 2019-11-05 DIAGNOSIS — F41.9 ANXIETY: ICD-10-CM

## 2019-11-05 DIAGNOSIS — F90.9 ATTENTION DEFICIT HYPERACTIVITY DISORDER (ADHD), UNSPECIFIED ADHD TYPE: ICD-10-CM

## 2019-11-05 DIAGNOSIS — L01.00 IMPETIGO: ICD-10-CM

## 2019-11-05 PROCEDURE — 90472 IMMUNIZATION ADMIN EACH ADD: CPT | Performed by: PHYSICIAN ASSISTANT

## 2019-11-05 PROCEDURE — 90715 TDAP VACCINE 7 YRS/> IM: CPT | Performed by: PHYSICIAN ASSISTANT

## 2019-11-05 PROCEDURE — 90686 IIV4 VACC NO PRSV 0.5 ML IM: CPT | Performed by: PHYSICIAN ASSISTANT

## 2019-11-05 PROCEDURE — 90471 IMMUNIZATION ADMIN: CPT | Performed by: PHYSICIAN ASSISTANT

## 2019-11-05 PROCEDURE — 90651 9VHPV VACCINE 2/3 DOSE IM: CPT | Performed by: PHYSICIAN ASSISTANT

## 2019-11-05 PROCEDURE — 99214 OFFICE O/P EST MOD 30 MIN: CPT | Mod: 25 | Performed by: PHYSICIAN ASSISTANT

## 2019-11-05 RX ORDER — LISDEXAMFETAMINE DIMESYLATE 60 MG/1
60 CAPSULE ORAL DAILY
Qty: 30 CAPSULE | Refills: 0 | Status: SHIPPED | OUTPATIENT
Start: 2019-11-05 | End: 2019-12-05

## 2019-11-05 RX ORDER — CITALOPRAM HYDROBROMIDE 20 MG/1
20 TABLET ORAL DAILY
Qty: 30 TABLET | Refills: 1 | Status: SHIPPED | OUTPATIENT
Start: 2019-11-05 | End: 2019-12-05

## 2019-11-05 ASSESSMENT — PATIENT HEALTH QUESTIONNAIRE - PHQ9
SUM OF ALL RESPONSES TO PHQ QUESTIONS 1-9: 4
5. POOR APPETITE OR OVEREATING: NOT AT ALL

## 2019-11-05 ASSESSMENT — MIFFLIN-ST. JEOR: SCORE: 1367.39

## 2019-11-05 ASSESSMENT — ANXIETY QUESTIONNAIRES
5. BEING SO RESTLESS THAT IT IS HARD TO SIT STILL: NOT AT ALL
IF YOU CHECKED OFF ANY PROBLEMS ON THIS QUESTIONNAIRE, HOW DIFFICULT HAVE THESE PROBLEMS MADE IT FOR YOU TO DO YOUR WORK, TAKE CARE OF THINGS AT HOME, OR GET ALONG WITH OTHER PEOPLE: SOMEWHAT DIFFICULT
6. BECOMING EASILY ANNOYED OR IRRITABLE: NOT AT ALL
GAD7 TOTAL SCORE: 6
2. NOT BEING ABLE TO STOP OR CONTROL WORRYING: SEVERAL DAYS
1. FEELING NERVOUS, ANXIOUS, OR ON EDGE: MORE THAN HALF THE DAYS
7. FEELING AFRAID AS IF SOMETHING AWFUL MIGHT HAPPEN: MORE THAN HALF THE DAYS
3. WORRYING TOO MUCH ABOUT DIFFERENT THINGS: SEVERAL DAYS

## 2019-11-05 NOTE — NURSING NOTE
"Prior to immunization administration, verified patients identity using patient s name and date of birth. Please see Immunization Activity for additional information.     Screening Questionnaire for Adult Immunization    Are you sick today?   No   Do you have allergies to medications, food, a vaccine component or latex?   No   Have you ever had a serious reaction after receiving a vaccination?   No   Do you have a long-term health problem with heart disease, lung disease, asthma, kidney disease, metabolic disease (e.g. diabetes), anemia, or other blood disorder?   No   Do you have cancer, leukemia, HIV/AIDS, or any other immune system problem?   No   In the past 3 months, have you taken medications that affect  your immune system, such as prednisone, other steroids, or anticancer drugs; drugs for the treatment of rheumatoid arthritis, Crohn s disease, or psoriasis; or have you had radiation treatments?   No   Have you had a seizure, or a brain or other nervous system problem?   Yes   During the past year, have you received a transfusion of blood or blood     products, or been given immune (gamma) globulin or antiviral drug?   No   For women: Are you pregnant or is there a chance you could become        pregnant during the next month?   No   Have you received any vaccinations in the past 4 weeks?   No     Immunization questionnaire was positive for at least one answer.  Notified Provider (Tea Ellis PA-C).  \"OK\" to give per Tea Ellis PA-C        Per orders of Tea Ellis PA-C, injection of Tdap, HPV9 given by An DIPAK Curtis. Patient instructed to remain in clinic for 15 minutes afterwards, and to report any adverse reaction to me immediately.       Screening performed by An DIPAK Curtis on 11/5/2019 at 3:15 PM.    "

## 2019-11-05 NOTE — PROGRESS NOTES
"Subjective     Sofie Padilla is a 23 year old female who presents to clinic today for the following health issues:    HPI   Depression and Anxiety Follow-Up    How are you doing with your depression since your last visit? Improved     How are you doing with your anxiety since your last visit?  Improved     Are you having other symptoms that might be associated with depression or anxiety? No    Have you had a significant life event? No     Do you have any concerns with your use of alcohol or other drugs? No    Social History     Tobacco Use     Smoking status: Current Every Day Smoker     Packs/day: 0.20     Types: Cigarettes     Smokeless tobacco: Never Used     Tobacco comment:     Substance Use Topics     Alcohol use: No     Drug use: No     PHQ 11/21/2018 5/10/2019 7/29/2019   PHQ-9 Total Score 2 12 5   Q9: Thoughts of better off dead/self-harm past 2 weeks Not at all Not at all Not at all     SALOMÓN-7 SCORE 11/21/2018 5/10/2019 7/29/2019   Total Score - - -   Total Score 4 12 10   Suicide Assessment Five-step Evaluation and Treatment (SAFE-T)      How many servings of fruits and vegetables do you eat daily?  2-3    On average, how many sweetened beverages do you drink each day (soda, juice, sweet tea, etc)?   0-1    How many days per week do you miss taking your medication? 0      Review of Systems   ROS COMP: Constitutional, HEENT, cardiovascular, pulmonary, gi and gu systems are negative, except as otherwise noted.      Objective    /60   Pulse 113   Temp 98.3  F (36.8  C) (Oral)   Resp 16   Ht 1.593 m (5' 2.72\")   Wt 63.8 kg (140 lb 9.6 oz)   SpO2 98%   BMI 25.13 kg/m    Body mass index is 25.13 kg/m .     Physical Exam   GENERAL: healthy, alert and no distress  MS: no gross musculoskeletal defects noted, no edema  SKIN: no suspicious lesions or rashes  PSYCH: Psych: Alert and oriented times 3; coherent speech, normal   rate and volume, able to articulate logical thoughts, able   to abstract " reason, no tangential thoughts, no hallucinations   or delusions  Her affect is congruent      Diagnostic Test Results:  none         Assessment & Plan     1. Major depressive disorder, recurrent, in remission, unspecified (H)  - citalopram (CELEXA) 20 MG tablet; Take 1 tablet (20 mg) by mouth daily  Dispense: 30 tablet; Refill: 1    2. Anxiety  - citalopram (CELEXA) 20 MG tablet; Take 1 tablet (20 mg) by mouth daily  Dispense: 30 tablet; Refill: 1    3. Attention deficit hyperactivity disorder (ADHD), unspecified ADHD type  - lisdexamfetamine (VYVANSE) 60 MG capsule; Take 1 capsule (60 mg) by mouth daily  Dispense: 30 capsule; Refill: 0    4. Impetigo  - mupirocin (BACTROBAN NASAL) 2 % nasal ointment; Apply to affected area 3 times/day for 5-7 days  Dispense: 22 g; Refill: 0    5. Need for prophylactic vaccination and inoculation against influenza  - C HUMAN PAPILLOMA VIRUS VACCINE (GARDASIL 9) 3 DOSE IM  - TDAP VACCINE (ADACEL)  - INFLUENZA VACCINE IM > 6 MONTHS VALENT IIV4 [76689]  - Vaccine Administration, Initial [96261]     Use medication as directed.  Follow up in 2-4 weeks  Patient amenable to this follow up plan.           Tea Ellis PA-C  [facility]

## 2019-11-06 ASSESSMENT — ANXIETY QUESTIONNAIRES: GAD7 TOTAL SCORE: 6

## 2019-11-11 ENCOUNTER — TELEPHONE (OUTPATIENT)
Dept: FAMILY MEDICINE | Facility: CLINIC | Age: 24
End: 2019-11-11

## 2019-11-11 DIAGNOSIS — L01.00 IMPETIGO: Primary | ICD-10-CM

## 2019-11-11 NOTE — TELEPHONE ENCOUNTER
Received fax from pharmacy stating that mupirocin (BACTROBAN NASAL) 2 % nasal ointment is not formulary, can we change this to Bactroban topical ointment  Shelbi Curtis CMA on 11/11/2019 at 8:57 AM

## 2019-11-12 RX ORDER — MUPIROCIN 20 MG/G
OINTMENT TOPICAL 3 TIMES DAILY
Qty: 22 G | Refills: 0 | Status: SHIPPED | OUTPATIENT
Start: 2019-11-12 | End: 2020-03-03

## 2019-12-05 ENCOUNTER — OFFICE VISIT (OUTPATIENT)
Dept: FAMILY MEDICINE | Facility: CLINIC | Age: 24
End: 2019-12-05
Payer: COMMERCIAL

## 2019-12-05 VITALS
DIASTOLIC BLOOD PRESSURE: 60 MMHG | HEIGHT: 63 IN | BODY MASS INDEX: 24.27 KG/M2 | WEIGHT: 137 LBS | TEMPERATURE: 98.6 F | RESPIRATION RATE: 20 BRPM | SYSTOLIC BLOOD PRESSURE: 140 MMHG | HEART RATE: 106 BPM | OXYGEN SATURATION: 100 %

## 2019-12-05 DIAGNOSIS — F41.9 ANXIETY: ICD-10-CM

## 2019-12-05 DIAGNOSIS — F90.9 ATTENTION DEFICIT HYPERACTIVITY DISORDER (ADHD), UNSPECIFIED ADHD TYPE: ICD-10-CM

## 2019-12-05 DIAGNOSIS — F32.5 MAJOR DEPRESSION IN REMISSION (H): ICD-10-CM

## 2019-12-05 DIAGNOSIS — Z23 NEED FOR VACCINATION: Primary | ICD-10-CM

## 2019-12-05 PROCEDURE — 90651 9VHPV VACCINE 2/3 DOSE IM: CPT | Performed by: PHYSICIAN ASSISTANT

## 2019-12-05 PROCEDURE — 99214 OFFICE O/P EST MOD 30 MIN: CPT | Mod: 25 | Performed by: PHYSICIAN ASSISTANT

## 2019-12-05 PROCEDURE — 90471 IMMUNIZATION ADMIN: CPT | Performed by: PHYSICIAN ASSISTANT

## 2019-12-05 RX ORDER — LISDEXAMFETAMINE DIMESYLATE 60 MG/1
60 CAPSULE ORAL DAILY
Qty: 30 CAPSULE | Refills: 0 | Status: SHIPPED | OUTPATIENT
Start: 2019-12-05 | End: 2020-01-30

## 2019-12-05 RX ORDER — HYDROXYZINE HYDROCHLORIDE 25 MG/1
25 TABLET, FILM COATED ORAL EVERY 8 HOURS PRN
Qty: 24 TABLET | Refills: 1 | Status: SHIPPED | OUTPATIENT
Start: 2019-12-05 | End: 2020-03-03

## 2019-12-05 RX ORDER — CITALOPRAM HYDROBROMIDE 20 MG/1
20 TABLET ORAL DAILY
Qty: 30 TABLET | Refills: 1 | Status: SHIPPED | OUTPATIENT
Start: 2019-12-05 | End: 2020-01-06

## 2019-12-05 ASSESSMENT — MIFFLIN-ST. JEOR: SCORE: 1346.06

## 2019-12-05 NOTE — NURSING NOTE
Prior to immunization administration, verified patients identity using patient s name and date of birth. Please see Immunization Activity for additional information.     Screening Questionnaire for Adult Immunization    Are you sick today?   No   Do you have allergies to medications, food, a vaccine component or latex?   No   Have you ever had a serious reaction after receiving a vaccination?   No   Do you have a long-term health problem with heart disease, lung disease, asthma, kidney disease, metabolic disease (e.g. diabetes), anemia, or other blood disorder?   No   Do you have cancer, leukemia, HIV/AIDS, or any other immune system problem?   No   In the past 3 months, have you taken medications that affect  your immune system, such as prednisone, other steroids, or anticancer drugs; drugs for the treatment of rheumatoid arthritis, Crohn s disease, or psoriasis; or have you had radiation treatments?   No   Have you had a seizure, or a brain or other nervous system problem?   No   During the past year, have you received a transfusion of blood or blood     products, or been given immune (gamma) globulin or antiviral drug?   No   For women: Are you pregnant or is there a chance you could become        pregnant during the next month?   No   Have you received any vaccinations in the past 4 weeks?   No     Immunization questionnaire answers were all negative.        Per orders of Tea Ellis PA-C, injection of HPV9 given by An DIPAK Curtis. Patient instructed to remain in clinic for 15 minutes afterwards, and to report any adverse reaction to me immediately.       Screening performed by An DIPAK Curtis on 12/5/2019 at 5:37 PM.

## 2019-12-05 NOTE — PROGRESS NOTES
"Subjective     Sofie Padilla is a 24 year old female who presents to clinic today for the following health issues:    HPI   Depression and Anxiety Follow-Up    How are you doing with your depression since your last visit? No change    How are you doing with your anxiety since your last visit?  Worsened     Are you having other symptoms that might be associated with depression or anxiety? Yes:  short of breath, racing heart beat    Have you had a significant life event? No     Do you have any concerns with your use of alcohol or other drugs? No    Social History     Tobacco Use     Smoking status: Current Every Day Smoker     Packs/day: 0.20     Types: Cigarettes     Smokeless tobacco: Never Used     Tobacco comment:     Substance Use Topics     Alcohol use: No     Drug use: No     PHQ 5/10/2019 7/29/2019 11/5/2019   PHQ-9 Total Score 12 5 4   Q9: Thoughts of better off dead/self-harm past 2 weeks Not at all Not at all Not at all     SALOMÓN-7 SCORE 5/10/2019 7/29/2019 11/5/2019   Total Score - - -   Total Score 12 10 6     Patient notes that she feels anxious daily but only for a short time.  She will see if there is a pattern to her sx.     Suicide Assessment Five-step Evaluation and Treatment (SAFE-T)      How many servings of fruits and vegetables do you eat daily?  0-1    On average, how many sweetened beverages do you drink each day (Examples: soda, juice, sweet tea, etc.  Do NOT count diet or artificially sweetened beverages)?   0    How many days per week do you miss taking your medication? 0      Review of Systems   ROS COMP: Constitutional, HEENT, cardiovascular, pulmonary, gi and gu systems are negative, except as otherwise noted.      Objective    BP (!) 140/60   Pulse 106   Temp 98.6  F (37  C) (Oral)   Resp 20   Ht 1.593 m (5' 2.72\")   Wt 62.1 kg (137 lb)   SpO2 100%   BMI 24.49 kg/m    Body mass index is 24.49 kg/m .  Physical Exam   GENERAL: healthy, alert and no distress  MS: no gross " musculoskeletal defects noted, no edema  SKIN: no suspicious lesions or rashes  PSYCH: Psych: Alert and oriented times 3; coherent speech, normal   rate and volume, able to articulate logical thoughts, able   to abstract reason, no tangential thoughts, no hallucinations   or delusions  Her affect is appropriate.       Diagnostic Test Results:  none         Assessment & Plan     1. Major depression in remission (H)  - citalopram (CELEXA) 20 MG tablet; Take 1 tablet (20 mg) by mouth daily  Dispense: 30 tablet; Refill: 1  - MENTAL HEALTH REFERRAL  - Adult; Outpatient Treatment; Individual/Couples/Family/Group Therapy/Health Psychology; [facility] [phone]; We will contact you to schedule the appointment or please call with any questions    2. Anxiety  - citalopram (CELEXA) 20 MG tablet; Take 1 tablet (20 mg) by mouth daily  Dispense: 30 tablet; Refill: 1  - MENTAL [facility] REFERRAL  - Adult; Outpatient Treatment; Individual/Couples/Family/Group Therapy/Health Psychology; [facility]: [facility] [phone]; We will contact you to schedule the appointment or please call with any questions  - hydrOXYzine (ATARAX) 25 MG tablet; Take 1 tablet (25 mg) by mouth every 8 hours as needed for anxiety  Dispense: 24 tablet; Refill: 1    3. Attention deficit hyperactivity disorder (ADHD), unspecified ADHD type  - lisdexamfetamine (VYVANSE) 60 MG capsule; Take 1 capsule (60 mg) by mouth daily  Dispense: 30 capsule; Refill: 0    4. Need for vaccination  - HUMAN PAPILLOMA VIRUS (GARDASIL 9) VACCINE [28945]     Use medication as directed.  Follow up per [facility]  Follow up in 1 month  Patient amenable to this follow up plan.     Tea Ellis PA-C  [facility]

## 2019-12-06 ASSESSMENT — ANXIETY QUESTIONNAIRES
6. BECOMING EASILY ANNOYED OR IRRITABLE: SEVERAL DAYS
GAD7 TOTAL SCORE: 7
1. FEELING NERVOUS, ANXIOUS, OR ON EDGE: NEARLY EVERY DAY
7. FEELING AFRAID AS IF SOMETHING AWFUL MIGHT HAPPEN: SEVERAL DAYS
3. WORRYING TOO MUCH ABOUT DIFFERENT THINGS: SEVERAL DAYS
2. NOT BEING ABLE TO STOP OR CONTROL WORRYING: SEVERAL DAYS
5. BEING SO RESTLESS THAT IT IS HARD TO SIT STILL: NOT AT ALL

## 2019-12-06 ASSESSMENT — PATIENT HEALTH QUESTIONNAIRE - PHQ9
5. POOR APPETITE OR OVEREATING: NOT AT ALL
SUM OF ALL RESPONSES TO PHQ QUESTIONS 1-9: 0

## 2019-12-07 ASSESSMENT — ANXIETY QUESTIONNAIRES: GAD7 TOTAL SCORE: 7

## 2020-01-03 ENCOUNTER — TELEPHONE (OUTPATIENT)
Dept: FAMILY MEDICINE | Facility: CLINIC | Age: 25
End: 2020-01-03

## 2020-01-03 DIAGNOSIS — F33.40 MAJOR DEPRESSIVE DISORDER, RECURRENT, IN REMISSION, UNSPECIFIED (H): ICD-10-CM

## 2020-01-03 DIAGNOSIS — F41.9 ANXIETY: ICD-10-CM

## 2020-01-03 DIAGNOSIS — F32.5 MAJOR DEPRESSION IN REMISSION (H): ICD-10-CM

## 2020-01-03 NOTE — TELEPHONE ENCOUNTER
Prior Authorization Retail Medication Request    Medication/Dose: lisdexamfetamine (VYVANSE) 60 MG capsule  ICD code (if different than what is on RX):  Attention deficit hyperactivity disorder (ADHD), unspecified ADHD type (F90.9)  Previously Tried and Failed:    Rationale:      Insurance Name: [facility]  Insurance ID:  [id number]       Pharmacy Information (if different than what is on RX)  Name:  [id number] - [place], [place] - [address] AT [facility] & [address]  Phone: [phone]

## 2020-01-04 NOTE — TELEPHONE ENCOUNTER
"Prior Authorization Not Needed per Insurance    Medication: lisdexamfetamine (VYVANSE) 60 MG capsule  Insurance Company: [employer] - Phone [phone] Fax [phone]  Expected CoPay:      Pharmacy Filling the Rx: [id number] - [address] AT [facility]  Pharmacy Notified: Yes  Patient Notified: No    Called pharmacy to confirm which insurance they are billing. The technician that I spoke with confirmed both insurances they had on file were stating \"inactive\"  R[place] check [place] the [place] website and her coverage does state inactive.      "

## 2020-01-06 RX ORDER — CITALOPRAM HYDROBROMIDE 10 MG/1
TABLET ORAL
Qty: 30 TABLET | Refills: 0 | Status: SHIPPED | OUTPATIENT
Start: 2020-01-06 | End: 2020-01-06 | Stop reason: ALTCHOICE

## 2020-01-06 RX ORDER — CITALOPRAM HYDROBROMIDE 20 MG/1
20 TABLET ORAL DAILY
Qty: 30 TABLET | Refills: 0 | Status: SHIPPED | OUTPATIENT
Start: 2020-01-06 | End: 2020-01-29

## 2020-01-24 ENCOUNTER — TELEPHONE (OUTPATIENT)
Dept: FAMILY MEDICINE | Facility: CLINIC | Age: 25
End: 2020-01-24

## 2020-01-24 NOTE — LETTER
January 24, 2020          Sofie Padilla,  [address] [place] [address]        Dear Sofie Padilla      Monitoring and managing your preventative and chronic health conditions are very important to us. Our records indicate that you have not scheduled for Pap Smear and Annual physical exam  which was recommended by Dr. Beavers      If you have received your health care elsewhere, please call the clinic so the information can be documented in your chart.    Please call [phone] or message us through your [facility] [employer] account to schedule an appointment or provide information for your chart.     Feel free to contact us if you have any questions or concerns!    I look forward to seeing you and working with you on your health care needs.     Sincerely,       Your [facility] Team

## 2020-01-24 NOTE — TELEPHONE ENCOUNTER
Panel Management Review      Patient has the following on her problem list:     Depression / Dysthymia review    Measure:  Needs PHQ-9 score of 4 or less during index window.  Administer PHQ-9 and if score is 5 or more, send encounter to provider for next steps.    5 - 7 month window range: 5    PHQ-9 SCORE 7/29/2019 11/5/2019 12/6/2019   PHQ-9 Total Score - - -   PHQ-9 Total Score 5 4 0       If PHQ-9 recheck is 5 or more, route to provider for next steps.    Patient is due for:  None      Composite cancer screening  Chart review shows that this patient is due/due soon for the following Pap Smear  Summary:    Patient is due/failing the following:   PAP and PHYSICAL    Action needed:   Patient needs office visit for CPE.    Type of outreach:    Sent letter.    Questions for provider review:    None                                                                                                                                    Jyoti Rodarte [facility]         Chart routed to  .

## 2020-01-29 DIAGNOSIS — F90.9 ATTENTION DEFICIT HYPERACTIVITY DISORDER (ADHD), UNSPECIFIED ADHD TYPE: ICD-10-CM

## 2020-01-29 DIAGNOSIS — F32.5 MAJOR DEPRESSION IN REMISSION (H): ICD-10-CM

## 2020-01-29 DIAGNOSIS — F41.9 ANXIETY: ICD-10-CM

## 2020-01-29 RX ORDER — CITALOPRAM HYDROBROMIDE 20 MG/1
20 TABLET ORAL DAILY
Qty: 30 TABLET | Refills: 0 | Status: SHIPPED | OUTPATIENT
Start: 2020-01-29 | End: 2020-01-31

## 2020-01-29 NOTE — TELEPHONE ENCOUNTER
Controlled Substance Refill Request for lisdexamfetamine (VYVANSE) 60 MG capsule  Problem List Complete:  Yes  ADHD (attention deficit hyperactivity disorder)   Problem Detail     Noted:  4/14/2011   Priority:  Medium   Overview Signed 10/15/2014  5:47 PM by Eliazar Dumont MD   Adderall XR 3/2011 - 7/2012  Changed to Metadate ER because Adderall XR didn't seem to be working well enough. Concerta hadn't been covered by insurance in the past.  Didn't work any better so tried Vyvanse 10/2012  By 10/2014, didn't seem adequate enough so started trial of Concerta.       Last Written Prescription Date:  12/05/2019  Last Fill Quantity: 30,   # refills: 0    THE MOST RECENT OFFICE VISIT MUST BE WITHIN THE PAST 3 MONTHS. AT LEAST ONE FACE TO FACE VISIT MUST OCCUR EVERY 6 MONTHS. ADDITIONAL VISITS CAN BE VIRTUAL.  (THIS STATEMENT SHOULD BE DELETED.)    Last Office Visit with [facility] primary care provider: 12/05/2019    Future Office visit:   Next 5 appointments (look out 90 days)    Jan 31, 2020  8:40 AM CST  Office Visit with Tea Ellis PA-C  [facility] [address] [place] [id number]  [phone]          Controlled substance agreement:   Encounter-Level CSA:    There are no encounter-level csa.     Patient-Level CSA:    There are no patient-level csa.         Last Urine Drug Screen: No results found for: CDAUT, No results found for: COMDAT, No results found for: THC13, PCP13, COC13, MAMP13, OPI13, AMP13, BZO13, TCA13, MTD13, BAR13, OXY13, PPX13, BUP13     Processing:  Rx to be electronically transmitted to pharmacy by provider     [email or web address]   checked in past 3 months?  No, route to ANURAG Curtis CMA

## 2020-01-29 NOTE — TELEPHONE ENCOUNTER
"Routing refill request to provider for review/approval because:  Drug not on the [facility] refill protocol   : Not added as delegate under PCP.  Rx last prescribed 12/05/2019 per Epic.    Medication is being filled for 1 time refill only due to:  Patient needs to be seen because  referral placed.    Requested Prescriptions   Pending Prescriptions Disp Refills     lisdexamfetamine (VYVANSE) 60 MG capsule 30 capsule 0     Sig: Take 1 capsule (60 mg) by mouth daily       There is no refill protocol information for this order        citalopram (CELEXA) 20 MG tablet 30 tablet 0     Sig: Take 1 tablet (20 mg) by mouth daily       SSRIs Protocol Passed - 1/29/2020  2:15 PM        Passed - PHQ-9 score less than 5 in past 6 months     Please review last PHQ-9 score.           Passed - Medication is active on med list        Passed - Patient is age 18 or older        Passed - No active pregnancy on record        Passed - No positive pregnancy test in last 12 months        Passed - Recent (6 mo) or future (30 days) visit within the authorizing provider's specialty     Patient had office visit in the last 6 months or has a visit in the next 30 days with authorizing provider or within the authorizing provider's specialty.  See \"Patient Info\" tab in inbasket, or \"Choose Columns\" in Meds & Orders section of the refill encounter.                "

## 2020-01-29 NOTE — TELEPHONE ENCOUNTER
Patient stated that she has not made the appointment with mental health due to insurance.  Patient stated that she is scheduled with her PCP colleague on 1/31/2020 and will proceed to schedule an appointment with Mental Health     Lashawn Damian MA on 1/29/2020 at 2:50 PM

## 2020-01-29 NOTE — TELEPHONE ENCOUNTER
Reason for Call:  Other prescription      Detailed comments: patient needs refills for:    lisdexamfetamine (VYVANSE) 60 MG capsule    citalopram (CELEXA) 20 MG tablet    NEW PHARMACY:    [address]    Patient has a scheduled visit with Tea Ellis on Fri. 1-31-20 @ 8:40 am.    Patient is out of meds and wants to  this evening.      Phone Number Patient can be reached at: Cell number on file:    Telephone Information:   Mobile [phone]       Best Time: asap    Can we leave a detailed message on this number? YES    Call taken on 1/29/2020 at 11:39 AM by Neela Rodríguez

## 2020-01-30 RX ORDER — LISDEXAMFETAMINE DIMESYLATE 60 MG/1
60 CAPSULE ORAL DAILY
Qty: 30 CAPSULE | Refills: 0 | Status: SHIPPED | OUTPATIENT
Start: 2020-01-30 | End: 2020-01-31

## 2020-01-31 ENCOUNTER — OFFICE VISIT (OUTPATIENT)
Dept: FAMILY MEDICINE | Facility: CLINIC | Age: 25
End: 2020-01-31
Payer: MEDICAID

## 2020-01-31 VITALS
TEMPERATURE: 96.5 F | HEART RATE: 80 BPM | OXYGEN SATURATION: 98 % | WEIGHT: 145 LBS | SYSTOLIC BLOOD PRESSURE: 110 MMHG | BODY MASS INDEX: 25.92 KG/M2 | DIASTOLIC BLOOD PRESSURE: 66 MMHG

## 2020-01-31 DIAGNOSIS — F41.9 ANXIETY: ICD-10-CM

## 2020-01-31 DIAGNOSIS — F90.9 ATTENTION DEFICIT HYPERACTIVITY DISORDER (ADHD), UNSPECIFIED ADHD TYPE: ICD-10-CM

## 2020-01-31 DIAGNOSIS — F32.5 MAJOR DEPRESSION IN REMISSION (H): ICD-10-CM

## 2020-01-31 PROCEDURE — 99214 OFFICE O/P EST MOD 30 MIN: CPT | Performed by: PHYSICIAN ASSISTANT

## 2020-01-31 RX ORDER — LISDEXAMFETAMINE DIMESYLATE 60 MG/1
60 CAPSULE ORAL DAILY
Qty: 30 CAPSULE | Refills: 0 | Status: SHIPPED | OUTPATIENT
Start: 2020-01-31 | End: 2020-03-03

## 2020-01-31 RX ORDER — CITALOPRAM HYDROBROMIDE 20 MG/1
20 TABLET ORAL DAILY
Qty: 30 TABLET | Refills: 0 | Status: SHIPPED | OUTPATIENT
Start: 2020-01-31 | End: 2020-03-03

## 2020-01-31 ASSESSMENT — ANXIETY QUESTIONNAIRES
2. NOT BEING ABLE TO STOP OR CONTROL WORRYING: NOT AT ALL
GAD7 TOTAL SCORE: 0
5. BEING SO RESTLESS THAT IT IS HARD TO SIT STILL: NOT AT ALL
3. WORRYING TOO MUCH ABOUT DIFFERENT THINGS: NOT AT ALL
6. BECOMING EASILY ANNOYED OR IRRITABLE: NOT AT ALL
7. FEELING AFRAID AS IF SOMETHING AWFUL MIGHT HAPPEN: NOT AT ALL
IF YOU CHECKED OFF ANY PROBLEMS ON THIS QUESTIONNAIRE, HOW DIFFICULT HAVE THESE PROBLEMS MADE IT FOR YOU TO DO YOUR WORK, TAKE CARE OF THINGS AT HOME, OR GET ALONG WITH OTHER PEOPLE: NOT DIFFICULT AT ALL
1. FEELING NERVOUS, ANXIOUS, OR ON EDGE: NOT AT ALL

## 2020-01-31 ASSESSMENT — PATIENT HEALTH QUESTIONNAIRE - PHQ9
5. POOR APPETITE OR OVEREATING: NOT AT ALL
SUM OF ALL RESPONSES TO PHQ QUESTIONS 1-9: 0

## 2020-01-31 NOTE — PROGRESS NOTES
Subjective     Sofie Padilla is a 24 year old female who presents to clinic today for the following health issues:    HPI   Chief Complaint   Patient presents with     A.D.H.D     Depression     Anxiety     Health Maintenance     DUE: PHQ9     Patient feel better since using atarax for acute anxiety episodes.  PHQ-9 and SALOMÓN-7 obtained and reviewed.     Review of Systems   ROS COMP: Constitutional, HEENT, cardiovascular, pulmonary, gi and gu systems are negative, except as otherwise noted.      Objective    /66   Pulse 80   Temp 96.5  F (35.8  C) (Oral)   Wt 65.8 kg (145 lb)   SpO2 98%   BMI 25.92 kg/m    Body mass index is 25.92 kg/m .  Physical Exam   GENERAL: healthy, alert and no distress  MS: no gross musculoskeletal defects noted, no edema  SKIN: no suspicious lesions or rashes  PSYCH: Psych: Alert and oriented times 3; coherent speech, normal   rate and volume, able to articulate logical thoughts, able   to abstract reason, no tangential thoughts, no hallucinations   or delusions  Her affect is congruent.       Diagnostic Test Results:  none         Assessment & Plan     1. Attention deficit hyperactivity disorder (ADHD), unspecified ADHD type  - lisdexamfetamine (VYVANSE) 60 MG capsule; Take 1 capsule (60 mg) by mouth daily  Dispense: 30 capsule; Refill: 0    2. Major depression in remission (H)  - citalopram (CELEXA) 20 MG tablet; Take 1 tablet (20 mg) by mouth daily  Dispense: 30 tablet; Refill: 0    3. Anxiety  - citalopram (CELEXA) 20 MG tablet; Take 1 tablet (20 mg) by mouth daily  Dispense: 30 tablet; Refill: 0       Use medication as directed.  Follow up in 1 month  Patient amenable to this follow up plan.     No follow-ups on file.    Tea Ellis PA-C  [facility]

## 2020-02-01 ASSESSMENT — ANXIETY QUESTIONNAIRES: GAD7 TOTAL SCORE: 0

## 2020-03-03 ENCOUNTER — OFFICE VISIT (OUTPATIENT)
Dept: FAMILY MEDICINE | Facility: CLINIC | Age: 25
End: 2020-03-03
Payer: COMMERCIAL

## 2020-03-03 VITALS
DIASTOLIC BLOOD PRESSURE: 60 MMHG | WEIGHT: 146.2 LBS | HEIGHT: 63 IN | BODY MASS INDEX: 25.91 KG/M2 | TEMPERATURE: 97.9 F | HEART RATE: 72 BPM | RESPIRATION RATE: 16 BRPM | OXYGEN SATURATION: 98 % | SYSTOLIC BLOOD PRESSURE: 120 MMHG

## 2020-03-03 DIAGNOSIS — H93.8X3 IRRITATION OF BOTH EARS: Primary | ICD-10-CM

## 2020-03-03 DIAGNOSIS — F32.5 MAJOR DEPRESSION IN REMISSION (H): ICD-10-CM

## 2020-03-03 DIAGNOSIS — F90.9 ATTENTION DEFICIT HYPERACTIVITY DISORDER (ADHD), UNSPECIFIED ADHD TYPE: ICD-10-CM

## 2020-03-03 DIAGNOSIS — F41.9 ANXIETY: ICD-10-CM

## 2020-03-03 PROCEDURE — 99214 OFFICE O/P EST MOD 30 MIN: CPT | Performed by: PHYSICIAN ASSISTANT

## 2020-03-03 RX ORDER — LISDEXAMFETAMINE DIMESYLATE 60 MG/1
60 CAPSULE ORAL DAILY
Qty: 30 CAPSULE | Refills: 0 | Status: SHIPPED | OUTPATIENT
Start: 2020-03-03 | End: 2020-04-02

## 2020-03-03 RX ORDER — CITALOPRAM HYDROBROMIDE 20 MG/1
20 TABLET ORAL DAILY
Qty: 30 TABLET | Refills: 0 | Status: SHIPPED | OUTPATIENT
Start: 2020-03-03 | End: 2020-04-02

## 2020-03-03 RX ORDER — HYDROXYZINE HYDROCHLORIDE 25 MG/1
25 TABLET, FILM COATED ORAL EVERY 8 HOURS PRN
Qty: 24 TABLET | Refills: 1 | Status: SHIPPED | OUTPATIENT
Start: 2020-03-03 | End: 2020-09-02

## 2020-03-03 ASSESSMENT — ANXIETY QUESTIONNAIRES
2. NOT BEING ABLE TO STOP OR CONTROL WORRYING: NOT AT ALL
7. FEELING AFRAID AS IF SOMETHING AWFUL MIGHT HAPPEN: NOT AT ALL
IF YOU CHECKED OFF ANY PROBLEMS ON THIS QUESTIONNAIRE, HOW DIFFICULT HAVE THESE PROBLEMS MADE IT FOR YOU TO DO YOUR WORK, TAKE CARE OF THINGS AT HOME, OR GET ALONG WITH OTHER PEOPLE: SOMEWHAT DIFFICULT
GAD7 TOTAL SCORE: 1
6. BECOMING EASILY ANNOYED OR IRRITABLE: NOT AT ALL
5. BEING SO RESTLESS THAT IT IS HARD TO SIT STILL: NOT AT ALL
1. FEELING NERVOUS, ANXIOUS, OR ON EDGE: SEVERAL DAYS
3. WORRYING TOO MUCH ABOUT DIFFERENT THINGS: NOT AT ALL

## 2020-03-03 ASSESSMENT — PATIENT HEALTH QUESTIONNAIRE - PHQ9
SUM OF ALL RESPONSES TO PHQ QUESTIONS 1-9: 2
5. POOR APPETITE OR OVEREATING: NOT AT ALL

## 2020-03-03 ASSESSMENT — MIFFLIN-ST. JEOR: SCORE: 1398.41

## 2020-03-03 NOTE — PROGRESS NOTES
Subjective     Sofie Padilla is a 24 year old female who presents to clinic today for the following health issues:    HPI   Depression and Anxiety Follow-Up    How are you doing with your depression since your last visit? No change    How are you doing with your anxiety since your last visit?  No change    Are you having other symptoms that might be associated with depression or anxiety? No    Have you had a significant life event? No     Do you have any concerns with your use of alcohol or other drugs? No    Social History     Tobacco Use     Smoking status: Current Every Day Smoker     Packs/day: 0.20     Types: Cigarettes     Smokeless tobacco: Never Used     Tobacco comment:     Substance Use Topics     Alcohol use: No     Drug use: No     PHQ 11/5/2019 12/6/2019 1/31/2020   PHQ-9 Total Score 4 0 0   Q9: Thoughts of better off dead/self-harm past 2 weeks Not at all Not at all Not at all     SALOMÓN-7 SCORE 11/5/2019 12/6/2019 1/31/2020   Total Score - - -   Total Score 6 7 0     Suicide Assessment Five-step Evaluation and Treatment (SAFE-T)      How many servings of fruits and vegetables do you eat daily?  2-3    On average, how many sweetened beverages do you drink each day (Examples: soda, juice, sweet tea, etc.  Do NOT count diet or artificially sweetened beverages)?   0    How many days per week do you exercise enough to make your heart beat faster? 5    How many minutes a day do you exercise enough to make your heart beat faster? 60 or more    How many days per week do you miss taking your medication? 0    Ear Pain       Duration: 1 week    Description (location/character/radiation): bilateral ear pain but left ear is worse    Intensity:  2/10    Accompanying signs and symptoms: painful     History (similar episodes/previous evaluation): None    Precipitating or alleviating factors: None    Therapies tried and outcome: OTC ear drops     ADHD follow up.  Patient notes that she is doing well on current  "therapy. Has started using new ear buds. PHQ-9 and SALOMÓN-7 obtained and reviewed.     Review of Systems   ROS COMP: Constitutional, HEENT, cardiovascular, pulmonary, gi and gu systems are negative, except as otherwise noted.      Objective    /60   Pulse 72   Temp 97.9  F (36.6  C) (Oral)   Resp 16   Ht 1.61 m (5' 3.39\")   Wt 66.3 kg (146 lb 3.2 oz)   SpO2 98%   BMI 25.58 kg/m    Body mass index is 25.58 kg/m .  Physical Exam   GENERAL: healthy, alert and no distress  HENT: ear canals sl tenderness and erythema increased at introitus and TM's normal.  MS: no gross musculoskeletal defects noted, no edema  SKIN: no suspicious lesions or rashes  PSYCH: Psych: Alert and oriented times 3; coherent speech, normal   rate and volume, able to articulate logical thoughts, able   to abstract reason, no tangential thoughts, no hallucinations   or delusions  Her affect is congruent.       Diagnostic Test Results:  none         Assessment & Plan     1. Major depression in remission (H)  - citalopram (CELEXA) 20 MG tablet; Take 1 tablet (20 mg) by mouth daily  Dispense: 30 tablet; Refill: 0    2. Anxiety  - citalopram (CELEXA) 20 MG tablet; Take 1 tablet (20 mg) by mouth daily  Dispense: 30 tablet; Refill: 0  - hydrOXYzine (ATARAX) 25 MG tablet; Take 1 tablet (25 mg) by mouth every 8 hours as needed for anxiety  Dispense: 24 tablet; Refill: 1    3. Attention deficit hyperactivity disorder (ADHD), unspecified ADHD type  - lisdexamfetamine (VYVANSE) 60 MG capsule; Take 1 capsule (60 mg) by mouth daily  Dispense: 30 capsule; Refill: 0    4. Irritation of both ears  - discontinue ear buds and wear headphones for now.     Follow up if symptoms should persist, change or worsen.  Use medication as directed.         BMI:   Estimated body mass index is 25.58 kg/m  as calculated from the following:    Height as of this encounter: 1.61 m (5' 3.39\").    Weight as of this encounter: 66.3 kg (146 lb 3.2 oz).         Return in about 4 " weeks (around 3/31/2020) for Routine Visit, Physical Exam.    Tea Ellis PA-C  [facility]

## 2020-03-04 ASSESSMENT — ANXIETY QUESTIONNAIRES: GAD7 TOTAL SCORE: 1

## 2020-04-02 ENCOUNTER — VIRTUAL VISIT (OUTPATIENT)
Dept: FAMILY MEDICINE | Facility: CLINIC | Age: 25
End: 2020-04-02
Payer: COMMERCIAL

## 2020-04-02 DIAGNOSIS — F90.9 ATTENTION DEFICIT HYPERACTIVITY DISORDER (ADHD), UNSPECIFIED ADHD TYPE: ICD-10-CM

## 2020-04-02 DIAGNOSIS — F41.9 ANXIETY: ICD-10-CM

## 2020-04-02 DIAGNOSIS — F32.5 MAJOR DEPRESSION IN REMISSION (H): ICD-10-CM

## 2020-04-02 PROCEDURE — 99442 ZZC PHYSICIAN TELEPHONE EVALUATION 11-20 MIN: CPT | Performed by: PHYSICIAN ASSISTANT

## 2020-04-02 RX ORDER — LISDEXAMFETAMINE DIMESYLATE 60 MG/1
60 CAPSULE ORAL DAILY
Qty: 60 CAPSULE | Refills: 0 | Status: SHIPPED | OUTPATIENT
Start: 2020-04-02 | End: 2020-05-01

## 2020-04-02 RX ORDER — CITALOPRAM HYDROBROMIDE 20 MG/1
20 TABLET ORAL DAILY
Qty: 60 TABLET | Refills: 0 | Status: SHIPPED | OUTPATIENT
Start: 2020-04-02 | End: 2020-04-14

## 2020-04-02 ASSESSMENT — ANXIETY QUESTIONNAIRES
IF YOU CHECKED OFF ANY PROBLEMS ON THIS QUESTIONNAIRE, HOW DIFFICULT HAVE THESE PROBLEMS MADE IT FOR YOU TO DO YOUR WORK, TAKE CARE OF THINGS AT HOME, OR GET ALONG WITH OTHER PEOPLE: EXTREMELY DIFFICULT
1. FEELING NERVOUS, ANXIOUS, OR ON EDGE: SEVERAL DAYS
3. WORRYING TOO MUCH ABOUT DIFFERENT THINGS: NOT AT ALL
2. NOT BEING ABLE TO STOP OR CONTROL WORRYING: SEVERAL DAYS
GAD7 TOTAL SCORE: 4
7. FEELING AFRAID AS IF SOMETHING AWFUL MIGHT HAPPEN: SEVERAL DAYS
6. BECOMING EASILY ANNOYED OR IRRITABLE: SEVERAL DAYS
5. BEING SO RESTLESS THAT IT IS HARD TO SIT STILL: NOT AT ALL

## 2020-04-02 ASSESSMENT — PATIENT HEALTH QUESTIONNAIRE - PHQ9
5. POOR APPETITE OR OVEREATING: NOT AT ALL
SUM OF ALL RESPONSES TO PHQ QUESTIONS 1-9: 1

## 2020-04-02 NOTE — PROGRESS NOTES
"Subjective     Sofie Padilla is a 24 year old female who is being evaluated via a billable telephone visit.      The patient has been notified of following:     \"This telephone visit will be conducted via a call between you and your physician/provider. We have found that certain health care needs can be provided without the need for a physical exam.  This service lets us provide the care you need with a short phone conversation.  If a prescription is necessary we can send it directly to your pharmacy.  If lab work is needed we can place an order for that and you can then stop by our lab to have the test done at a later time.    If during the course of the call the physician/provider feels a telephone visit is not appropriate, you will not be charged for this service.\"     Patient has given verbal consent for Telephone visit?  Yes    Sofie Padilla complains of   Chief Complaint   Patient presents with     A.D.H.D     Depression     Anxiety       ALLERGIES  Nkda [no known drug allergies]    Depression and Anxiety Follow-Up    How are you doing with your depression since your last visit? No change    How are you doing with your anxiety since your last visit?  Improved     Are you having other symptoms that might be associated with depression or anxiety? No    Have you had a significant life event? OTHER: not working right now due to COVID-19.     Do you have any concerns with your use of alcohol or other drugs? No    Social History     Tobacco Use     Smoking status: Current Every Day Smoker     Packs/day: 0.20     Types: Cigarettes     Smokeless tobacco: Never Used     Tobacco comment:     Substance Use Topics     Alcohol use: No     Drug use: No     PHQ 1/31/2020 3/3/2020 4/2/2020   PHQ-9 Total Score 0 2 1   Q9: Thoughts of better off dead/self-harm past 2 weeks Not at all Not at all Not at all     SALOMÓN-7 SCORE 1/31/2020 3/3/2020 4/2/2020   Total Score - - -   Total Score 0 1 4         Suicide Assessment " Five-step Evaluation and Treatment (SAFE-T)      How many servings of fruits and vegetables do you eat daily?  2-3    On average, how many sweetened beverages do you drink each day (Examples: soda, juice, sweet tea, etc.  Do NOT count diet or artificially sweetened beverages)?   0    How many days per week do you exercise enough to make your heart beat faster? 3 or less    How many minutes a day do you exercise enough to make your heart beat faster? 60 or more    How many days per week do you miss taking your medication? 0    Patient notes she is doing fairly well despite the stress of Covid 19.  She will follow up in clinic for review and preventative services when she is able.    Review of Systems   ROS COMP: Constitutional, HEENT, cardiovascular, pulmonary, gi and gu systems are negative, except as otherwise noted.           Assessment/Plan:  1. Attention deficit hyperactivity disorder (ADHD), unspecified ADHD type  - lisdexamfetamine (VYVANSE) 60 MG capsule; Take 1 capsule (60 mg) by mouth daily  Dispense: 60 capsule; Refill: 0    2. Major depression in remission (H)  - citalopram (CELEXA) 20 MG tablet; Take 1 tablet (20 mg) by mouth daily  Dispense: 60 tablet; Refill: 0    3. Anxiety  - citalopram (CELEXA) 20 MG tablet; Take 1 tablet (20 mg) by mouth daily  Dispense: 60 tablet; Refill: 0      Phone call duration:  12 minutes    Tea Ellis PA-C

## 2020-04-03 ASSESSMENT — ANXIETY QUESTIONNAIRES: GAD7 TOTAL SCORE: 4

## 2020-04-13 DIAGNOSIS — F41.9 ANXIETY: ICD-10-CM

## 2020-04-13 DIAGNOSIS — F32.5 MAJOR DEPRESSION IN REMISSION (H): ICD-10-CM

## 2020-04-14 RX ORDER — CITALOPRAM HYDROBROMIDE 20 MG/1
20 TABLET ORAL DAILY
Qty: 90 TABLET | Refills: 1 | Status: SHIPPED | OUTPATIENT
Start: 2020-04-14 | End: 2020-05-01

## 2020-05-01 ENCOUNTER — VIRTUAL VISIT (OUTPATIENT)
Dept: FAMILY MEDICINE | Facility: CLINIC | Age: 25
End: 2020-05-01
Payer: COMMERCIAL

## 2020-05-01 DIAGNOSIS — F32.5 MAJOR DEPRESSION IN REMISSION (H): ICD-10-CM

## 2020-05-01 DIAGNOSIS — F41.9 ANXIETY: ICD-10-CM

## 2020-05-01 DIAGNOSIS — F90.9 ATTENTION DEFICIT HYPERACTIVITY DISORDER (ADHD), UNSPECIFIED ADHD TYPE: ICD-10-CM

## 2020-05-01 PROCEDURE — 99441 ZZC PHYSICIAN TELEPHONE EVALUATION 5-10 MIN: CPT | Performed by: PHYSICIAN ASSISTANT

## 2020-05-01 RX ORDER — LISDEXAMFETAMINE DIMESYLATE 60 MG/1
60 CAPSULE ORAL DAILY
Qty: 60 CAPSULE | Refills: 0 | Status: SHIPPED | OUTPATIENT
Start: 2020-05-01 | End: 2020-06-02

## 2020-05-01 RX ORDER — CITALOPRAM HYDROBROMIDE 20 MG/1
20 TABLET ORAL DAILY
Qty: 90 TABLET | Refills: 1 | Status: SHIPPED | OUTPATIENT
Start: 2020-05-01 | End: 2020-09-02

## 2020-05-01 NOTE — PROGRESS NOTES
"Sofie Padilla is a 24 year old female who is being evaluated via a billable telephone visit.      The patient has been notified of following:     \"This telephone visit will be conducted via a call between you and your physician/provider. We have found that certain health care needs can be provided without the need for a physical exam.  This service lets us provide the care you need with a short phone conversation.  If a prescription is necessary we can send it directly to your pharmacy.  If lab work is needed we can place an order for that and you can then stop by our lab to have the test done at a later time.    Telephone visits are billed at different rates depending on your insurance coverage. During this emergency period, for some insurers they may be billed the same as an in-person visit.  Please reach out to your insurance provider with any questions.    If during the course of the call the physician/provider feels a telephone visit is not appropriate, you will not be charged for this service.\"    Patient has given verbal consent for Telephone visit?  Yes    How would you like to obtain your AVS? Mail a copy    Subjective     Sofie Padilla is a 24 year old female who presents to clinic today for the following health issues:    HPI  Medication Followup of lisdexamfetamine (VYVANSE) 60 MG capsule     Taking Medication as prescribed: yes    Side Effects:  None    Medication Helping Symptoms:  yes     Patient states that her treatment is going really well.      Review of Systems   ROS COMP: Constitutional, HEENT, cardiovascular, pulmonary, gi and gu systems are negative, except as otherwise noted.           Assessment/Plan:  1. Attention deficit hyperactivity disorder (ADHD), unspecified ADHD type  - lisdexamfetamine (VYVANSE) 60 MG capsule; Take 1 capsule (60 mg) by mouth daily  Dispense: 60 capsule; Refill: 0    2. Major depression in remission (H)  - citalopram (CELEXA) 20 MG tablet; Take 1 tablet (20 mg) " by mouth daily  Dispense: 90 tablet; Refill: 1    3. Anxiety  - citalopram (CELEXA) 20 MG tablet; Take 1 tablet (20 mg) by mouth daily  Dispense: 90 tablet; Refill: 1    Return in about 2 months (around 7/1/2020).      Phone call duration:  10 minutes    Tea Ellis PA-C

## 2020-06-02 ENCOUNTER — VIRTUAL VISIT (OUTPATIENT)
Dept: FAMILY MEDICINE | Facility: CLINIC | Age: 25
End: 2020-06-02
Payer: COMMERCIAL

## 2020-06-02 DIAGNOSIS — F90.9 ATTENTION DEFICIT HYPERACTIVITY DISORDER (ADHD), UNSPECIFIED ADHD TYPE: ICD-10-CM

## 2020-06-02 PROCEDURE — 99213 OFFICE O/P EST LOW 20 MIN: CPT | Mod: 95 | Performed by: NURSE PRACTITIONER

## 2020-06-02 RX ORDER — LISDEXAMFETAMINE DIMESYLATE 60 MG/1
60 CAPSULE ORAL DAILY
Qty: 60 CAPSULE | Refills: 0 | Status: SHIPPED | OUTPATIENT
Start: 2020-06-02 | End: 2020-09-02

## 2020-06-02 NOTE — PROGRESS NOTES
"Sofie Padilla is a 24 year old female who is being evaluated via a billable telephone visit.      The patient has been notified of following:     \"This telephone visit will be conducted via a call between you and your physician/provider. We have found that certain health care needs can be provided without the need for a physical exam.  This service lets us provide the care you need with a short phone conversation.  If a prescription is necessary we can send it directly to your pharmacy.  If lab work is needed we can place an order for that and you can then stop by our lab to have the test done at a later time.    Telephone visits are billed at different rates depending on your insurance coverage. During this emergency period, for some insurers they may be billed the same as an in-person visit.  Please reach out to your insurance provider with any questions.    If during the course of the call the physician/provider feels a telephone visit is not appropriate, you will not be charged for this service.\"    Patient has given verbal consent for Telephone visit?  Yes    What phone number would you like to be contacted at? [phone]    How would you like to obtain your AVS? Not needed  Subjective     Sofie Padilla is a 24 year old female who presents via phone visit today for the following health issues:    HPI   Chief Complaint   Patient presents with     Medication Request     Patient had a visit for refills on 5/1/20.  No changes were made.  ADHD is stable on  Vyvanse.  She denies side effects.      Patient Active Problem List   Diagnosis     Allergic rhinitis     ADHD (attention deficit hyperactivity disorder)     Anxiety     Tobacco abuse     Major depression in remission (H)     Past Surgical History:   Procedure Laterality Date     PE TUBES  age 3     TYMPANOPLASTY  3/19/2013    Procedure: TYMPANOPLASTY;  Right fat graft tympanoplasty;  Surgeon: Jersey Ricardo MD;  Location:  OR       Social " History     Tobacco Use     Smoking status: Current Every Day Smoker     Packs/day: 0.20     Types: Cigarettes     Smokeless tobacco: Never Used     Tobacco comment:     Substance Use Topics     Alcohol use: No     Family History   Problem Relation Age of Onset     C.A.D. Father      Diabetes Father         Pre-diabetes     Arthritis Maternal Grandmother      Cardiovascular Maternal Grandmother         heart disease     Alzheimer Disease Paternal Grandfather      Hypertension Paternal Grandfather      Asthma Brother      Depression Mother      Depression Maternal Grandfather      Heart Disease Maternal Aunt         enlarged heart     Heart Disease Maternal Aunt         enlarged heart     Cancer Maternal Aunt      Heart Disease Maternal Uncle         enlarged heart     Gastrointestinal Disease Maternal Uncle         colitis     Cerebrovascular Disease No family hx of      Thyroid Disease No family hx of      Glaucoma No family hx of      Macular Degeneration No family hx of          Current Outpatient Medications   Medication Sig Dispense Refill     citalopram (CELEXA) 20 MG tablet Take 1 tablet (20 mg) by mouth daily 90 tablet 1     hydrOXYzine (ATARAX) 25 MG tablet Take 1 tablet (25 mg) by mouth every 8 hours as needed for anxiety 24 tablet 1     lisdexamfetamine (VYVANSE) 60 MG capsule Take 1 capsule (60 mg) by mouth daily Must be 28 days between refills. 60 capsule 0     lisdexamfetamine (VYVANSE) 60 MG capsule Take 1 capsule (60 mg) by mouth daily Must be 28 days between refills. 60 capsule 0     lisdexamfetamine (VYVANSE) 60 MG capsule Take 1 capsule (60 mg) by mouth daily Must be 28 days between refills. 60 capsule 0     mupirocin (BACTROBAN NASAL) 2 % nasal ointment Apply to affected area 3 times/day for 5-7 days 22 g 0     Allergies   Allergen Reactions     Nkda [No Known Drug Allergies]      BP Readings from Last 3 Encounters:   03/03/20 120/60   01/31/20 110/66   12/05/19 (!) 140/60    Wt Readings from  Last 3 Encounters:   03/03/20 66.3 kg (146 lb 3.2 oz)   01/31/20 65.8 kg (145 lb)   12/05/19 62.1 kg (137 lb)                    Reviewed and updated as needed this visit by Provider  Tobacco  Allergies  Meds  Problems  Med Hx  Surg Hx  Fam Hx         Review of Systems   Constitutional, HEENT, cardiovascular, pulmonary, gi and gu systems are negative, except as otherwise noted.       Objective   Reported vitals:  There were no vitals taken for this visit.   healthy, alert and no distress  PSYCH: Alert and oriented times 3; coherent speech, normal   rate and volume, able to articulate logical thoughts, able   to abstract reason, no tangential thoughts, no hallucinations   or delusions  Her affect is normal  RESP: No cough, no audible wheezing, able to talk in full sentences  Remainder of exam unable to be completed due to telephone visits    Diagnostic Test Results:  none         Assessment/Plan:  1. Attention deficit hyperactivity disorder (ADHD), unspecified ADHD type  Stable.  Continue current treatment plan and medications.   - lisdexamfetamine (VYVANSE) 60 MG capsule; Take 1 capsule (60 mg) by mouth daily Must be 28 days between refills.  Dispense: 60 capsule; Refill: 0  - lisdexamfetamine (VYVANSE) 60 MG capsule; Take 1 capsule (60 mg) by mouth daily Must be 28 days between refills.  Dispense: 60 capsule; Refill: 0  - lisdexamfetamine (VYVANSE) 60 MG capsule; Take 1 capsule (60 mg) by mouth daily Must be 28 days between refills.  Dispense: 60 capsule; Refill: 0    Return in about 3 months (around 9/2/2020) for Medication Recheck.      Phone call duration:  2 minutes    PERNELL Gastelum CNP

## 2020-09-01 DIAGNOSIS — F90.9 ATTENTION DEFICIT HYPERACTIVITY DISORDER (ADHD), UNSPECIFIED ADHD TYPE: ICD-10-CM

## 2020-09-02 ENCOUNTER — VIRTUAL VISIT (OUTPATIENT)
Dept: FAMILY MEDICINE | Facility: CLINIC | Age: 25
End: 2020-09-02
Payer: COMMERCIAL

## 2020-09-02 ENCOUNTER — TELEPHONE (OUTPATIENT)
Dept: FAMILY MEDICINE | Facility: CLINIC | Age: 25
End: 2020-09-02

## 2020-09-02 DIAGNOSIS — F41.9 ANXIETY: ICD-10-CM

## 2020-09-02 DIAGNOSIS — F90.9 ATTENTION DEFICIT HYPERACTIVITY DISORDER (ADHD), UNSPECIFIED ADHD TYPE: ICD-10-CM

## 2020-09-02 DIAGNOSIS — F32.5 MAJOR DEPRESSION IN REMISSION (H): ICD-10-CM

## 2020-09-02 PROCEDURE — 96127 BRIEF EMOTIONAL/BEHAV ASSMT: CPT | Performed by: PHYSICIAN ASSISTANT

## 2020-09-02 PROCEDURE — 99214 OFFICE O/P EST MOD 30 MIN: CPT | Mod: 95 | Performed by: PHYSICIAN ASSISTANT

## 2020-09-02 RX ORDER — LISDEXAMFETAMINE DIMESYLATE 60 MG/1
60 CAPSULE ORAL DAILY
Qty: 30 CAPSULE | Refills: 0 | Status: SHIPPED | OUTPATIENT
Start: 2020-09-02 | End: 2020-09-30

## 2020-09-02 RX ORDER — CITALOPRAM HYDROBROMIDE 20 MG/1
20 TABLET ORAL DAILY
Qty: 90 TABLET | Refills: 1 | Status: SHIPPED | OUTPATIENT
Start: 2020-09-02 | End: 2020-10-27

## 2020-09-02 RX ORDER — LISDEXAMFETAMINE DIMESYLATE 60 MG/1
CAPSULE ORAL
Qty: 30 CAPSULE | Refills: 0 | OUTPATIENT
Start: 2020-09-02

## 2020-09-02 RX ORDER — HYDROXYZINE HYDROCHLORIDE 25 MG/1
25 TABLET, FILM COATED ORAL EVERY 8 HOURS PRN
Qty: 24 TABLET | Refills: 1 | Status: SHIPPED | OUTPATIENT
Start: 2020-09-02

## 2020-09-02 ASSESSMENT — ANXIETY QUESTIONNAIRES
1. FEELING NERVOUS, ANXIOUS, OR ON EDGE: SEVERAL DAYS
6. BECOMING EASILY ANNOYED OR IRRITABLE: NOT AT ALL
GAD7 TOTAL SCORE: 4
3. WORRYING TOO MUCH ABOUT DIFFERENT THINGS: SEVERAL DAYS
5. BEING SO RESTLESS THAT IT IS HARD TO SIT STILL: NOT AT ALL
IF YOU CHECKED OFF ANY PROBLEMS ON THIS QUESTIONNAIRE, HOW DIFFICULT HAVE THESE PROBLEMS MADE IT FOR YOU TO DO YOUR WORK, TAKE CARE OF THINGS AT HOME, OR GET ALONG WITH OTHER PEOPLE: SOMEWHAT DIFFICULT
2. NOT BEING ABLE TO STOP OR CONTROL WORRYING: NOT AT ALL
7. FEELING AFRAID AS IF SOMETHING AWFUL MIGHT HAPPEN: NOT AT ALL

## 2020-09-02 ASSESSMENT — PATIENT HEALTH QUESTIONNAIRE - PHQ9
5. POOR APPETITE OR OVEREATING: MORE THAN HALF THE DAYS
SUM OF ALL RESPONSES TO PHQ QUESTIONS 1-9: 0

## 2020-09-02 NOTE — TELEPHONE ENCOUNTER
Reason for call:  Other   Patient called regarding (reason for call): returning call  Additional comments: Patient is returning call please call back     Phone number to reach patient:  Home number on file [phone] (home) or Work number on file:  There is no work phone number on file.    Best Time:  any    Can we leave a detailed message on this number?  YES    Travel screening: Negative

## 2020-09-02 NOTE — TELEPHONE ENCOUNTER
Called and spoke to patient. Informed patient of message and patient verbally understand. Scheduled appointment today with Tea Ellis PA-C telephone visit.   Rhina Curtis CMA

## 2020-09-02 NOTE — TELEPHONE ENCOUNTER
Patient needs follow-up scheduled with PCP.  Can be virtual.  Please schedule and route back for refill.    Briana Pak, CNP

## 2020-09-02 NOTE — TELEPHONE ENCOUNTER
Routing refill request to provider for review/approval because:  Drug not on the [facility] refill protocol    checked, last dispensed on 7/30/2020    Controlled Substance Refill Request for lisdexamfetamine (VYVANSE) 60 MG capsule   Problem List Complete:  Yes    Last Written Prescription Date:  6/2/2020  Last Fill Quantity: 60,   # refills: 0    THE MOST RECENT OFFICE VISIT MUST BE WITHIN THE PAST 3 MONTHS. AT LEAST ONE FACE TO FACE VISIT MUST OCCUR EVERY 6 MONTHS. ADDITIONAL VISITS CAN BE VIRTUAL.  (THIS STATEMENT SHOULD BE DELETED.)    Last Office Visit with [facility] primary care provider: 6/2/2020    Future Office visit: None    Controlled substance agreement:   Encounter-Level CSA:    There are no encounter-level csa.     Patient-Level CSA:    There are no patient-level csa.         Last Urine Drug Screen: No results found for: CDAUT, No results found for: COMDAT, No results found for: THC13, PCP13, COC13, MAMP13, OPI13, AMP13, BZO13, TCA13, MTD13, BAR13, OXY13, PPX13, BUP13     Processing:  Rx to be electronically transmitted to pharmacy by provider     [email or web address]   checked in past 3 months?  Yes ,  checked, last dispensed on 7/30/2020     Ella Baker RN

## 2020-09-02 NOTE — TELEPHONE ENCOUNTER
lisdexamfetamine (VYVANSE) 60 MG capsule  30 capsule  0  9/2/2020   No    Sig - Route: Take 1 capsule (60 mg) by mouth daily Must be 28 days between refills. - Oral    Sent to pharmacy as: Lisdexamfetamine Dimesylate 60 MG Oral Capsule (Vyvanse)    Class: E-Prescribe    Earliest Fill Date: 9/2/2020    Order: [id number]    E-Prescribing Status: Receipt confirmed by pharmacy (9/2/2020  2:33 PM CDT)      Duplicate request.   Closing encounter.     Ella Baker RN

## 2020-09-02 NOTE — PROGRESS NOTES
"Sofie Padilla is a 24 year old female who is being evaluated via a billable telephone visit.      The patient has been notified of following:     \"This telephone visit will be conducted via a call between you and your physician/provider. We have found that certain health care needs can be provided without the need for a physical exam.  This service lets us provide the care you need with a short phone conversation.  If a prescription is necessary we can send it directly to your pharmacy.  If lab work is needed we can place an order for that and you can then stop by our lab to have the test done at a later time.    Telephone visits are billed at different rates depending on your insurance coverage. During this emergency period, for some insurers they may be billed the same as an in-person visit.  Please reach out to your insurance provider with any questions.    If during the course of the call the physician/provider feels a telephone visit is not appropriate, you will not be charged for this service.\"    Patient has given verbal consent for Telephone visit?  Yes    What phone number would you like to be contacted at? [phone]    How would you like to obtain your AVS? Mail a copy    Subjective     Sofie Padilla is a 24 year old female who presents via phone visit today for the following health issues:    HPI    Depression and Anxiety Follow-Up    How are you doing with your depression since your last visit? No change    How are you doing with your anxiety since your last visit?  No change    Are you having other symptoms that might be associated with depression or anxiety? No    Have you had a significant life event? No     Do you have any concerns with your use of alcohol or other drugs? No    Social History     Tobacco Use     Smoking status: Current Every Day Smoker     Packs/day: 0.20     Types: Cigarettes     Smokeless tobacco: Never Used     Tobacco comment:     Substance Use Topics     Alcohol use: No "     Drug use: No     PHQ 3/3/2020 4/2/2020 9/2/2020   PHQ-9 Total Score 2 1 0   Q9: Thoughts of better off dead/self-harm past 2 weeks Not at all Not at all Not at all     SALOMÓN-7 SCORE 3/3/2020 4/2/2020 9/2/2020   Total Score - - -   Total Score 1 4 4         Suicide Assessment Five-step Evaluation and Treatment (SAFE-T)      How many servings of fruits and vegetables do you eat daily?  2-3    On average, how many sweetened beverages do you drink each day (Examples: soda, juice, sweet tea, etc.  Do NOT count diet or artificially sweetened beverages)?   0    How many days per week do you exercise enough to make your heart beat faster? 7    How many minutes a day do you exercise enough to make your heart beat faster? 60 or more    How many days per week do you miss taking your medication? 0    Medication Followup of lisdexamfetamine (VYVANSE) 60 MG capsule     Taking Medication as prescribed: yes    Side Effects:  None    Medication Helping Symptoms:  yes     Patient doing well on current treatment.  Will request refills next month via PostedIn    Review of Systems   Constitutional, HEENT, cardiovascular, pulmonary, gi and gu systems are negative, except as otherwise noted.       Objective          Vitals:  No vitals were obtained today due to virtual visit.    healthy, alert and no distress  PSYCH: Alert and oriented times 3; coherent speech, normal   rate and volume, able to articulate logical thoughts, able   to abstract reason, no tangential thoughts, no hallucinations   or delusions  Her affect is normal  RESP: No cough, no audible wheezing, able to talk in full sentences  Remainder of exam unable to be completed due to telephone visits            Assessment/Plan:    Assessment & Plan     Sofie was seen today for a.d.h.d and depression.    Diagnoses and all orders for this visit:    Attention deficit hyperactivity disorder (ADHD), unspecified ADHD type  -     lisdexamfetamine (VYVANSE) 60 MG capsule; Take 1 capsule  "(60 mg) by mouth daily Must be 28 days between refills.    Major depression in remission (H)  -     citalopram (CELEXA) 20 MG tablet; Take 1 tablet (20 mg) by mouth daily    Anxiety  -     citalopram (CELEXA) 20 MG tablet; Take 1 tablet (20 mg) by mouth daily  -     hydrOXYzine (ATARAX) 25 MG tablet; Take 1 tablet (25 mg) by mouth every 8 hours as needed for anxiety         Tobacco Cessation:   reports that she has been smoking cigarettes. She has been smoking about 0.20 packs per day. She has never used smokeless tobacco.      BMI:   Estimated body mass index is 25.58 kg/m  as calculated from the following:    Height as of 3/3/20: 1.61 m (5' 3.39\").    Weight as of 3/3/20: 66.3 kg (146 lb 3.2 oz).         Return in about 4 weeks (around 9/30/2020), or via [employer] for refill requests.    Tea Ellis PA-C  [facility]    Phone call duration:  10 minutes              "

## 2020-09-02 NOTE — TELEPHONE ENCOUNTER
Called and left message for patient to return call to clinic.  -Please help patient scheduled a follow up for Adhd and depression,anxitey check (can be virtual) when the return call to clinic.  Rhina Curtis CMA

## 2020-09-03 ASSESSMENT — ANXIETY QUESTIONNAIRES: GAD7 TOTAL SCORE: 4

## 2020-09-30 ENCOUNTER — VIRTUAL VISIT (OUTPATIENT)
Dept: FAMILY MEDICINE | Facility: CLINIC | Age: 25
End: 2020-09-30
Payer: COMMERCIAL

## 2020-09-30 DIAGNOSIS — F32.5 MAJOR DEPRESSION IN REMISSION (H): ICD-10-CM

## 2020-09-30 DIAGNOSIS — F90.9 ATTENTION DEFICIT HYPERACTIVITY DISORDER (ADHD), UNSPECIFIED ADHD TYPE: ICD-10-CM

## 2020-09-30 DIAGNOSIS — F41.9 ANXIETY: ICD-10-CM

## 2020-09-30 PROCEDURE — 99214 OFFICE O/P EST MOD 30 MIN: CPT | Mod: 95 | Performed by: FAMILY MEDICINE

## 2020-09-30 RX ORDER — LISDEXAMFETAMINE DIMESYLATE 60 MG/1
60 CAPSULE ORAL DAILY
Qty: 30 CAPSULE | Refills: 0 | Status: SHIPPED | OUTPATIENT
Start: 2020-10-02 | End: 2020-10-27

## 2020-09-30 NOTE — PATIENT INSTRUCTIONS
Script refill faxed.   Take medications as directed.  Cares and treatment plan discussed   Schedule for routine annual physical soon, as you are due.

## 2020-09-30 NOTE — PROGRESS NOTES
"Sofie Padilla is a 24 year old female who is being evaluated via a billable video visit.      The patient has been notified of following:     \"This video visit will be conducted via a call between you and your physician/provider. We have found that certain health care needs can be provided without the need for an in-person physical exam.  This service lets us provide the care you need with a video conversation.  If a prescription is necessary we can send it directly to your pharmacy.  If lab work is needed we can place an order for that and you can then stop by our lab to have the test done at a later time.    Video visits are billed at different rates depending on your insurance coverage.  Please reach out to your insurance provider with any questions.    If during the course of the call the physician/provider feels a video visit is not appropriate, you will not be charged for this service.\"    Patient has given verbal consent for Video visit? Yes  How would you like to obtain your AVS? MyChart  If you are dropped from the video visit, the video invite should be resent to: Text to cell phone:   Will anyone else be joining your video visit? No      Subjective     Sofie Padilla is a 24 year old female who presents today via video visit for the following health issues:    HPI    Medication Followup of Vyvanse    Taking Medication as prescribed: yes    Side Effects:  None    Medication Helping Symptoms:  yes         Video Start Time: 8:16 AM    When were you diagnosed with ADHD. Since childhood     How are you doing with your ADD since your last visit? No change. due for refill in  couple of days. Sometimes it gets hard to jo refill on time so it gets difficult for her without med's. Also she has moved so hard for her  to go back to previous pcp, so wants to switch closer.     No problem taking med's otherwise.        Are you having other symptoms that might be associated with depression? No, well " controlled on current med's .     Have you had a significant life event?  No     Are you having any problem taking medication ?   No    Do you have any concerns with your use of alcohol or other drugs? No      Depression and Anxiety Follow-Up    How are you doing with your depression since your last visit? No change, doing well on current med's See phq-9 and maximo 7    How are you doing with your anxiety since your last visit?  No change    Are you having other symptoms that might be associated with depression or anxiety? No    Have you had a significant life event? No     Do you have any concerns with your use of alcohol or other drugs? No    Social History     Tobacco Use     Smoking status: Former Smoker     Packs/day: 0.20     Types: Cigarettes     Smokeless tobacco: Never Used     Tobacco comment:     Substance Use Topics     Alcohol use: No     Drug use: No     PHQ 3/3/2020 4/2/2020 9/2/2020   PHQ-9 Total Score 2 1 0   Q9: Thoughts of better off dead/self-harm past 2 weeks Not at all Not at all Not at all     MAXIMO-7 SCORE 3/3/2020 4/2/2020 9/2/2020   Total Score - - -   Total Score 1 4 4     Last PHQ-9 9/2/2020   1.  Little interest or pleasure in doing things 0   2.  Feeling down, depressed, or hopeless 0   3.  Trouble falling or staying asleep, or sleeping too much 0   4.  Feeling tired or having little energy 0   5.  Poor appetite or overeating 0   6.  Feeling bad about yourself 0   7.  Trouble concentrating 0   8.  Moving slowly or restless 0   Q9: Thoughts of better off dead/self-harm past 2 weeks 0   PHQ-9 Total Score 0   Difficulty at work, home, or with people Not difficult at all     MAXIMO-7  9/2/2020   1. Feeling nervous, anxious, or on edge 1   2. Not being able to stop or control worrying 0   3. Worrying too much about different things 1   4. Trouble relaxing 2   5. Being so restless that it is hard to sit still 0   6. Becoming easily annoyed or irritable 0   7. Feeling afraid, as if something awful  might happen 0   SALOMÓN-7 Total Score 4   If you checked any problems, how difficult have they made it for you to do your work, take care of things at home, or get along with other people? Somewhat difficult       Suicide Assessment Five-step Evaluation and Treatment (SAFE-T)            Review of Systems   Constitutional, HEENT, cardiovascular, pulmonary, GI, , musculoskeletal, neuro, skin, endocrine and psych systems are negative, except as otherwise noted.      Objective           Vitals:  No vitals were obtained today due to virtual visit.    Physical Exam     GENERAL: Healthy, alert and no distress  EYES: Eyes grossly normal to inspection.  No discharge or erythema, or obvious scleral/conjunctival abnormalities.  RESP: No audible wheeze, cough, or visible cyanosis.  No visible retractions or increased work of breathing.    NEURO: Cranial nerves grossly intact.  Mentation and speech appropriate for age.  PSYCH: Mentation appears normal, affect normal/bright, judgement and insight intact, normal speech and appearance well-groomed.              Assessment & Plan     (F90.9) Attention deficit hyperactivity disorder (ADHD), unspecified ADHD type  Comment:   Plan: lisdexamfetamine (VYVANSE) 60 MG capsule            (F32.5) Major depression in remission (H)  Comment:   Plan:     (F41.9) Anxiety  Comment:   Plan:       Discussed cares, talked about  treatment .. Willing to continue with  Same med's. Refill given x for vyvanse. . spent sometimes counseling patient.  She can check with pharmacy about if they can keep 3 months prescription in file. Pros/ cons of med's discussed. Talked about need for  follow up in 3-4  Months with this prescription , sooner if problem. She verbalizes understanding.    Refill sent.Cares and  treatment discussed.   Patient expressed understanding and agreement with treatment plan. All patient's questions were answered, will let me know if has more later.  Medications: Rx's: Reviewed the  potential side effects/complications of medications prescribed.     Monique Peacock MD  [facility]    Script refill faxed.   Take medications as directed.  Cares and treatment plan discussed   Schedule for routine annual physical soon, as you are due.    Video-Visit Details    Type of service:  Video Visit    Video End Time:8:36 AM    Originating Location (pt. Location): Other in [facility]    Distant Location (provider location):  [facility]     Platform used for Video Visit: [employer]

## 2020-10-27 ENCOUNTER — OFFICE VISIT (OUTPATIENT)
Dept: FAMILY MEDICINE | Facility: CLINIC | Age: 25
End: 2020-10-27
Payer: COMMERCIAL

## 2020-10-27 VITALS
BODY MASS INDEX: 25.16 KG/M2 | HEIGHT: 63 IN | SYSTOLIC BLOOD PRESSURE: 112 MMHG | WEIGHT: 142 LBS | HEART RATE: 84 BPM | DIASTOLIC BLOOD PRESSURE: 70 MMHG | OXYGEN SATURATION: 98 % | TEMPERATURE: 98 F

## 2020-10-27 DIAGNOSIS — F32.5 MAJOR DEPRESSION IN REMISSION (H): Primary | ICD-10-CM

## 2020-10-27 DIAGNOSIS — F41.9 ANXIETY: ICD-10-CM

## 2020-10-27 DIAGNOSIS — F90.9 ATTENTION DEFICIT HYPERACTIVITY DISORDER (ADHD), UNSPECIFIED ADHD TYPE: ICD-10-CM

## 2020-10-27 DIAGNOSIS — Z23 NEED FOR PROPHYLACTIC VACCINATION AND INOCULATION AGAINST INFLUENZA: ICD-10-CM

## 2020-10-27 DIAGNOSIS — E55.9 VITAMIN D DEFICIENCY: ICD-10-CM

## 2020-10-27 PROCEDURE — 90471 IMMUNIZATION ADMIN: CPT | Performed by: FAMILY MEDICINE

## 2020-10-27 PROCEDURE — 99214 OFFICE O/P EST MOD 30 MIN: CPT | Mod: 25 | Performed by: FAMILY MEDICINE

## 2020-10-27 PROCEDURE — 82306 VITAMIN D 25 HYDROXY: CPT | Performed by: FAMILY MEDICINE

## 2020-10-27 PROCEDURE — 90686 IIV4 VACC NO PRSV 0.5 ML IM: CPT | Performed by: FAMILY MEDICINE

## 2020-10-27 PROCEDURE — 36415 COLL VENOUS BLD VENIPUNCTURE: CPT | Performed by: FAMILY MEDICINE

## 2020-10-27 RX ORDER — CITALOPRAM HYDROBROMIDE 20 MG/1
20 TABLET ORAL DAILY
Qty: 90 TABLET | Refills: 1 | Status: SHIPPED | OUTPATIENT
Start: 2020-10-27 | End: 2020-12-29

## 2020-10-27 RX ORDER — LISDEXAMFETAMINE DIMESYLATE 60 MG/1
60 CAPSULE ORAL DAILY
Qty: 30 CAPSULE | Refills: 0 | Status: SHIPPED | OUTPATIENT
Start: 2020-11-02 | End: 2020-11-30

## 2020-10-27 ASSESSMENT — ANXIETY QUESTIONNAIRES
7. FEELING AFRAID AS IF SOMETHING AWFUL MIGHT HAPPEN: SEVERAL DAYS
IF YOU CHECKED OFF ANY PROBLEMS ON THIS QUESTIONNAIRE, HOW DIFFICULT HAVE THESE PROBLEMS MADE IT FOR YOU TO DO YOUR WORK, TAKE CARE OF THINGS AT HOME, OR GET ALONG WITH OTHER PEOPLE: NOT DIFFICULT AT ALL
2. NOT BEING ABLE TO STOP OR CONTROL WORRYING: NOT AT ALL
3. WORRYING TOO MUCH ABOUT DIFFERENT THINGS: SEVERAL DAYS
GAD7 TOTAL SCORE: 7
5. BEING SO RESTLESS THAT IT IS HARD TO SIT STILL: NEARLY EVERY DAY
1. FEELING NERVOUS, ANXIOUS, OR ON EDGE: SEVERAL DAYS
6. BECOMING EASILY ANNOYED OR IRRITABLE: SEVERAL DAYS

## 2020-10-27 ASSESSMENT — PATIENT HEALTH QUESTIONNAIRE - PHQ9
SUM OF ALL RESPONSES TO PHQ QUESTIONS 1-9: 2
5. POOR APPETITE OR OVEREATING: NOT AT ALL

## 2020-10-27 ASSESSMENT — MIFFLIN-ST. JEOR: SCORE: 1369.27

## 2020-10-27 NOTE — PROGRESS NOTES
Subjective     Sofie Padilla is a 24 year old female who presents to clinic today for the following health issues:    HPI       here to establish care and get refill on med's   Just wants her vit-d level checked  since she does not get out much in winter and not enough sunlight and she also wonder if that could also affect her mood , so wants blood test   Depression and Anxiety Follow-Up     How are you doing with your depression since your last visit? No change, doing well on Celexa and wants to continue same dose. Needs refill     How are you doing with your anxiety since your last visit?  Improved has been exercising more , occasional anxiety bc of ongoing covid issue     Are you having other symptoms that might be associated with depression or anxiety? No    Have you had a significant life event? No      Do you have any concerns with your use of alcohol or other drugs? No         ADHD  follow     When were you diagnosed with ADHD. Since childhood     How are you doing with your ADD since your last visit? No change. due for refill in  couple of days.     Sometimes it gets hard to jo refill on time so it gets difficult for her without med's. So wish to get script today      Also she has moved so hard for her  to go back to previous PCP, so wants to switch closer.     No problem taking med's otherwise.      Social History     Tobacco Use     Smoking status: Former Smoker     Packs/day: 0.20     Types: Cigarettes     Smokeless tobacco: Never Used     Tobacco comment: quit few months ago on her own    Substance Use Topics     Alcohol use: No     Drug use: No     PHQ 3/3/2020 4/2/2020 9/2/2020   PHQ-9 Total Score 2 1 0   Q9: Thoughts of better off dead/self-harm past 2 weeks Not at all Not at all Not at all     SALOMÓN-7 SCORE 3/3/2020 4/2/2020 9/2/2020   Total Score - - -   Total Score 1 4 4     Last PHQ-9 10/27/2020   1.  Little interest or pleasure in doing things 0   2.  Feeling down, depressed, or hopeless 0   3.   Trouble falling or staying asleep, or sleeping too much 1   4.  Feeling tired or having little energy 0   5.  Poor appetite or overeating 0   6.  Feeling bad about yourself 0   7.  Trouble concentrating 1   8.  Moving slowly or restless 0   Q9: Thoughts of better off dead/self-harm past 2 weeks 0   PHQ-9 Total Score 2   Difficulty at work, home, or with people Somewhat difficult     SALOMÓN-7  10/27/2020   1. Feeling nervous, anxious, or on edge 1   2. Not being able to stop or control worrying 0   3. Worrying too much about different things 1   4. Trouble relaxing 0   5. Being so restless that it is hard to sit still 3   6. Becoming easily annoyed or irritable 1   7. Feeling afraid, as if something awful might happen 1   SALOMÓN-7 Total Score 7   If you checked any problems, how difficult have they made it for you to do your work, take care of things at home, or get along with other people? Not difficult at all       Suicide Assessment Five-step Evaluation and Treatment (SAFE-T)      How many servings of fruits and vegetables do you eat daily?  0-1    On average, how many sweetened beverages do you drink each day (Examples: soda, juice, sweet tea, etc.  Do NOT count diet or artificially sweetened beverages)?   0    How many days per week do you exercise enough to make your heart beat faster? 7    How many minutes a day do you exercise enough to make your heart beat faster? 30 - 60    How many days per week do you miss taking your medication? 0        Review of Systems   Constitutional, HEENT, cardiovascular, pulmonary, GI, , musculoskeletal, neuro, skin, endocrine and psych systems are negative, except as otherwise noted.      Objective    There were no vitals taken for this visit.  There is no height or weight on file to calculate BMI.  Physical Exam   GENERAL: healthy, alert and no distress  HENT: oropharynx clear and oral mucous membranes moist  NECK: no adenopathy  RESP: lungs clear to auscultation - no rales,  rhonchi or wheezes  CV: regular rate and rhythm, normal S1 S2,   ABDOMEN: soft, nontender, no hepatosplenomegaly, no masses and bowel sounds normal  PSYCH: mentation appears normal, affect normal/bright            Assessment & Plan     (F32.5) Major depression in remission (H)  (primary encounter diagnosis)  Comment:   Plan: citalopram (CELEXA) 20 MG tablet              (F41.9) Anxiety  Comment: improved and stable   Plan: citalopram (CELEXA) 20 MG tablet            (E55.9) Vitamin D deficiency  Comment:   Plan: Vitamin D Deficiency            (F90.9) Attention deficit hyperactivity disorder (ADHD), unspecified ADHD type  Comment: stable   Plan: lisdexamfetamine (VYVANSE) 60 MG capsule            (Z23) Need for prophylactic vaccination and inoculation against influenza  Comment:   Plan: INFLUENZA VACCINE IM > 6 MONTHS VALENT IIV4         [[id number]]    Check labs. refill sent.Cares and  treatment discussed.  follow up if problem   Patient expressed understanding and agreement with treatment plan. All patient's questions were answered, will let me know if has more later.  Medications: Rx's: Reviewed the potential side effects/complications of medications prescribed.       Monique Peacock MD  [facility]

## 2020-10-28 ASSESSMENT — ANXIETY QUESTIONNAIRES: GAD7 TOTAL SCORE: 7

## 2020-10-29 LAB — DEPRECATED CALCIDIOL+CALCIFEROL SERPL-MC: 37 UG/L (ref 20–75)

## 2020-11-30 DIAGNOSIS — F90.9 ATTENTION DEFICIT HYPERACTIVITY DISORDER (ADHD), UNSPECIFIED ADHD TYPE: ICD-10-CM

## 2020-11-30 RX ORDER — LISDEXAMFETAMINE DIMESYLATE 60 MG/1
60 CAPSULE ORAL DAILY
Qty: 30 CAPSULE | Refills: 0 | Status: SHIPPED | OUTPATIENT
Start: 2020-11-30 | End: 2020-12-31

## 2020-11-30 NOTE — TELEPHONE ENCOUNTER
Controlled Substance Refill Request for Vtvanse  Problem List Complete:  No     PROVIDER TO CONSIDER COMPLETION OF PROBLEM LIST AND OVERVIEW/CONTROLLED SUBSTANCE AGREEMENT    Last Written Prescription Date:  11/2/20  Last Fill Quantity: 30,   # refills: 0    THE MOST RECENT OFFICE VISIT MUST BE WITHIN THE PAST 3 MONTHS. AT LEAST ONE FACE TO FACE VISIT MUST OCCUR EVERY 6 MONTHS. ADDITIONAL VISITS CAN BE VIRTUAL.  (THIS STATEMENT SHOULD BE DELETED.)    Last Office Visit with [facility] primary care provider: 10/27/20    Future Office visit:     Controlled substance agreement:   Encounter-Level CSA:    There are no encounter-level csa.     Patient-Level CSA:    There are no patient-level csa.         Last Urine Drug Screen: No results found for: CDAUT, No results found for: COMDAT, No results found for: THC13, PCP13, COC13, MAMP13, OPI13, AMP13, BZO13, TCA13, MTD13, BAR13, OXY13, PPX13, BUP13     Processing:  Rx to be electronically transmitted to pharmacy by provider      [email or web address]       checked in past 3 months?  Yes 07/30/2020

## 2020-12-14 ENCOUNTER — VIRTUAL VISIT (OUTPATIENT)
Dept: FAMILY MEDICINE | Facility: CLINIC | Age: 25
End: 2020-12-14
Payer: COMMERCIAL

## 2020-12-14 DIAGNOSIS — F32.5 MAJOR DEPRESSION IN REMISSION (H): ICD-10-CM

## 2020-12-14 DIAGNOSIS — F41.9 ANXIETY: ICD-10-CM

## 2020-12-14 DIAGNOSIS — F90.9 ATTENTION DEFICIT HYPERACTIVITY DISORDER (ADHD), UNSPECIFIED ADHD TYPE: ICD-10-CM

## 2020-12-14 PROCEDURE — 99214 OFFICE O/P EST MOD 30 MIN: CPT | Mod: 95 | Performed by: FAMILY MEDICINE

## 2020-12-14 RX ORDER — CITALOPRAM HYDROBROMIDE 20 MG/1
20 TABLET ORAL DAILY
Qty: 90 TABLET | Refills: 1 | Status: CANCELLED | OUTPATIENT
Start: 2020-12-14

## 2020-12-14 RX ORDER — LISDEXAMFETAMINE DIMESYLATE 10 MG/1
CAPSULE ORAL
Qty: 14 CAPSULE | Refills: 0 | Status: SHIPPED | OUTPATIENT
Start: 2020-12-14 | End: 2020-12-29 | Stop reason: DRUGHIGH

## 2020-12-14 RX ORDER — LISDEXAMFETAMINE DIMESYLATE 60 MG/1
60 CAPSULE ORAL DAILY
Qty: 30 CAPSULE | Refills: 0 | Status: CANCELLED | OUTPATIENT
Start: 2020-12-14

## 2020-12-14 NOTE — PROGRESS NOTES
"Sofie Padilla is a 25 year old female who is being evaluated via a billable video visit.      The patient has been notified of following:     \"This video visit will be conducted via a call between you and your physician/provider. We have found that certain health care needs can be provided without the need for an in-person physical exam.  This service lets us provide the care you need with a video conversation.  If a prescription is necessary we can send it directly to your pharmacy.  If lab work is needed we can place an order for that and you can then stop by our lab to have the test done at a later time.    Video visits are billed at different rates depending on your insurance coverage.  Please reach out to your insurance provider with any questions.    If during the course of the call the physician/provider feels a video visit is not appropriate, you will not be charged for this service.\"    Patient has given verbal consent for Video visit? Yes  How would you like to obtain your AVS? MyChart  If you are dropped from the video visit, the video invite should be resent to: Text to cell phone: [phone]  Will anyone else be joining your video visit? No      Subjective     Sofie Padilla is a 25 year old female who presents today via video visit for the following health issues:    HPI     Medication Followup of Vyvanse    Taking Medication as prescribed: yes    Side Effects:  None    Medication Helping Symptoms:  Yes, it isn't as effective as it needs to be.          Video Start Time: 1:14 PM      When were you diagnosed with ADD.  Has been taking vyvanse 60 mg dose for a while and she thinsk her sx control  has not been as effective on current dose.sometimes hard to focus and getting work done , mod is ok, sleeping ok      She was on 70 mg dose previously and she thinsk it may have worked better,  so wants to try higher dose again. She had no problem taking med's otherwise .     How are you doing with your " ADD since your last visit? No change    Are you having other symptoms that might be associated with depression? No    Have you had a significant life event?  No     Are you having any problem taking medication ?   No    Do you have any concerns with your use of alcohol or other drugs? No      Depression and Anxiety Follow-Up  Assessment and Plan    How are you doing with your depression since your last visit? No change, she is still taking Celexa without problem and wants to continue     How are you doing with your anxiety since your last visit?  No change    Are you having other symptoms that might be associated with depression or anxiety? No    Have you had a significant life event? No     Do you have any concerns with your use of alcohol or other drugs? No    Social History     Tobacco Use     Smoking status: Former Smoker     Packs/day: 0.20     Types: Cigarettes     Smokeless tobacco: Never Used     Tobacco comment: quit few months ago on her own    Substance Use Topics     Alcohol use: No     Drug use: No     PHQ 4/2/2020 9/2/2020 10/27/2020   PHQ-9 Total Score 1 0 2   Q9: Thoughts of better off dead/self-harm past 2 weeks Not at all Not at all Not at all     SALOMÓN-7 SCORE 4/2/2020 9/2/2020 10/27/2020   Total Score - - -   Total Score 4 4 7     Last PHQ-9 10/27/2020   1.  Little interest or pleasure in doing things 0   2.  Feeling down, depressed, or hopeless 0   3.  Trouble falling or staying asleep, or sleeping too much 1   4.  Feeling tired or having little energy 0   5.  Poor appetite or overeating 0   6.  Feeling bad about yourself 0   7.  Trouble concentrating 1   8.  Moving slowly or restless 0   Q9: Thoughts of better off dead/self-harm past 2 weeks 0   PHQ-9 Total Score 2   Difficulty at work, home, or with people Somewhat difficult     SALOMÓN-7  10/27/2020   1. Feeling nervous, anxious, or on edge 1   2. Not being able to stop or control worrying 0   3. Worrying too much about different things 1   4.  Trouble relaxing 0   5. Being so restless that it is hard to sit still 3   6. Becoming easily annoyed or irritable 1   7. Feeling afraid, as if something awful might happen 1   SALOMÓN-7 Total Score 7   If you checked any problems, how difficult have they made it for you to do your work, take care of things at home, or get along with other people? Not difficult at all       Suicide Assessment Five-step Evaluation and Treatment (SAFE-T)        Review of Systems   Constitutional, HEENT, cardiovascular, pulmonary, GI, , musculoskeletal, neuro, skin, endocrine and psych systems are negative, except as otherwise noted.      Objective           Vitals:  No vitals were obtained today due to virtual visit.    Physical Exam     GENERAL: Healthy, alert and no distress  EYES: Eyes grossly normal to inspection.   RESP: No audible wheeze, cough, normal breathing .  No visible retractions or increased work of breathing.    NEURO:  grossly intact.  Mentation and speech appropriate for age.  PSYCH: Mentation appears normal, affect normal/bright, judgement and insight intact, normal speech and appearance well-groomed.          Assessment and Plan    (F90.9) Attention deficit hyperactivity disorder (ADHD), unspecified ADHD type  Comment:   Plan: lisdexamfetamine (VYVANSE) 10 MG capsule            (F32.5) Major depression in remission (H)  Comment:   Plan:     (F41.9) Anxiety  Comment:   Plan:   Discussed cares, talked about signs and symptoms of anxiety/ depression . She thinsk mood is ok and  is comfortable at current dose and willing to continue same dose for now . Wants to go back on vyvanse 70 mg dose to see how it works  Also talked about signing control substance agreement next visit    . Pros/ cons of med's discussed . encouraged to see  to help and referral given . spent sometimes counseling patient. Follow up in 2-3 months, sooner if problem.           Video-Visit Details    Type of service:  Video Visit    Video End  Time:1:37 PM    Originating Location (pt. Location): Home    Distant Location (provider location):  [facility]     Platform used for Video Visit: Jermaine  Did not work so used doximity

## 2020-12-14 NOTE — PATIENT INSTRUCTIONS
Take medications as directed.  Cares and symptomatic cares discussed   Follow up if problem or concern

## 2020-12-29 ENCOUNTER — VIRTUAL VISIT (OUTPATIENT)
Dept: FAMILY MEDICINE | Facility: CLINIC | Age: 25
End: 2020-12-29
Payer: COMMERCIAL

## 2020-12-29 DIAGNOSIS — F90.9 ATTENTION DEFICIT HYPERACTIVITY DISORDER (ADHD), UNSPECIFIED ADHD TYPE: ICD-10-CM

## 2020-12-29 DIAGNOSIS — R53.83 OTHER FATIGUE: Primary | ICD-10-CM

## 2020-12-29 DIAGNOSIS — F41.9 ANXIETY: ICD-10-CM

## 2020-12-29 DIAGNOSIS — F32.5 MAJOR DEPRESSION IN REMISSION (H): ICD-10-CM

## 2020-12-29 PROCEDURE — 99214 OFFICE O/P EST MOD 30 MIN: CPT | Mod: 95 | Performed by: FAMILY MEDICINE

## 2020-12-29 RX ORDER — CITALOPRAM HYDROBROMIDE 20 MG/1
30 TABLET ORAL DAILY
Qty: 45 TABLET | Refills: 1 | Status: SHIPPED | OUTPATIENT
Start: 2020-12-29 | End: 2021-02-01

## 2020-12-29 NOTE — PROGRESS NOTES
"Sofie Padilla is a 25 year old female who is being evaluated via a billable telephone visit.      The patient has been notified of following:     \"This telephone visit will be conducted via a call between you and your physician/provider. We have found that certain health care needs can be provided without the need for a physical exam.  This service lets us provide the care you need with a short phone conversation.  If a prescription is necessary we can send it directly to your pharmacy.  If lab work is needed we can place an order for that and you can then stop by our lab to have the test done at a later time.    Telephone visits are billed at different rates depending on your insurance coverage. During this emergency period, for some insurers they may be billed the same as an in-person visit.  Please reach out to your insurance provider with any questions.    If during the course of the call the physician/provider feels a telephone visit is not appropriate, you will not be charged for this service.\"    Patient has given verbal consent for Telephone visit?  Yes    What phone number would you like to be contacted at? [phone]    How would you like to obtain your AVS? MyChart    Subjective     Sofie Padilla is a 25 year old female who presents via phone visit today for the following health issues:    HPI     Medication Followup of Vyvanse    Taking Medication as prescribed: yes    Side Effects:  Headaches     Medication Helping Symptoms:  Not sure yet, does not feel a difference since dosage increased 2 weeks ago        When were you diagnosed with ADD.    How are you doing with your ADD since your last visit? No change  No improvement or change in concentration after increasing the  dose of vyvanse to 70 mg ,    Are you having other symptoms that might be associated with depression? No but does feel more anxious lately , feels down and also very tired and unable to concentrate.      has hx of depression and " anxiety in the past. Has not seen therapist in a long time and willing to try something for mood    Have you had a significant life event?  No     Are you having any problem taking medication ?   No    Do you have any concerns with your use of alcohol or other drugs? No    Admits lack of energy, fatigue all the time, lack of exercise bc of fatigue.  eating ok, no fever or chills,No other illnesses, weight is stable  .,       Review of Systems   Constitutional, HEENT, cardiovascular, pulmonary, GI, , musculoskeletal, neuro, skin, endocrine and psych systems are negative, except as otherwise noted.       Objective          Vitals:  No vitals were obtained today due to virtual visit.    healthy, alert and no distress  PSYCH: Alert and oriented times 3; coherent speech, normal   rate and volume, able to articulate logical thoughts, able   to abstract reason, no tangential thoughts, no hallucinations   or delusions  Her affect is normal  RESP: No cough, no audible wheezing, able to talk in full sentences  Remainder of exam unable to be completed due to telephone visits            Assessment/Plan:        (F90.9) Attention deficit hyperactivity disorder (ADHD), unspecified ADHD type  Comment: higher dose did not help so willing to go back n 60 mg dose again  Plan: lisdexamfetamine (VYVANSE) 60 MG capsule            (R53.83) Other fatigue  (primary encounter diagnosis)  Comment:   Plan: TSH with free T4 reflex, Vitamin D Deficiency,         Comprehensive metabolic panel, CBC with         platelets            (F32.5) Major depression in remission (H)  Comment:  recent worsening   Plan: citalopram (CELEXA) 20 MG tablet, MENTAL HEALTH        REFERRAL  - Adult; Outpatient Treatment;         Individual/Couples/Family/Group Therapy/Health         Psychology; [facility] [place]         [phone]; We will contact you to schedule        the appointment or please call with any         questions            (F41.9)  Anxiety  Comment: recent worsening   Plan: citalopram (CELEXA) 20 MG tablet                Discussed cares, talked about signs and symptoms of anxiety/ depression and treatment options. Willing to try  citalopram  20 mg.higher dose did not help so willing to go back n 60 mg dose again      Pros/ cons of med's discussed.     encouraged to see  to help and referral given . Talked about regular exercise to help improve fatigue. Also check labs, she will schedule for lab appointment    spent sometimes counseling patient. Follow up in 3-4 weeks,  sooner if problem.     Script  sent.Cares and  treatment discussed.    Patient expressed understanding and agreement with treatment plan. All patient's questions were answered, will let me know if has more later.  Medications: Rx's: Reviewed the potential side effects/complications of medications prescribed.         Phone call duration:  2 5 minutes

## 2020-12-29 NOTE — PATIENT INSTRUCTIONS
Order: Mental Health Referral - Adult; Outpatient Treatment; Individual/Couples/Family/Group Therapy/Health Psychology; [facility] [place] [facility] [phone]; We Will Contact You To Schedule The Appointment Or Please Call With Any Questions        Comment: All scheduling is subject to the client's specific insurance plan & benefits, provider/location availability, and provider clinical specialities.  Please arrive 15 minutes early for your first appointment and bring your completed paperwork.      Please be aware that coverage of these services is subject to the terms and limitations of your health insurance plan.  Call member services at your health plan with any benefit or coverage questions.     Take medications as directed.  Cares and symptomatic cares discussed   Follow up if problem or concern

## 2020-12-31 ENCOUNTER — MYC REFILL (OUTPATIENT)
Dept: FAMILY MEDICINE | Facility: CLINIC | Age: 25
End: 2020-12-31

## 2020-12-31 DIAGNOSIS — F90.9 ATTENTION DEFICIT HYPERACTIVITY DISORDER (ADHD), UNSPECIFIED ADHD TYPE: ICD-10-CM

## 2021-01-02 RX ORDER — LISDEXAMFETAMINE DIMESYLATE 60 MG/1
60 CAPSULE ORAL DAILY
Qty: 30 CAPSULE | Refills: 0 | Status: SHIPPED | OUTPATIENT
Start: 2021-01-02 | End: 2021-02-01

## 2021-01-02 NOTE — TELEPHONE ENCOUNTER
RX [place] ([place]) reviewed:  not reviewed/not due - last done on 7/30/20    [place]  [email or web address]    vyvanse      Last Written Prescription Date:  11/30/20  Last Fill Quantity: 30,   # refills: 0  Last Office Visit: 12/29/20 Dr. Peacock  Future Office visit:    Next 5 appointments (look out 90 days)    Mar 08, 2021 11:00 AM  [facility] [address]  [facility] [id number]  [phone]           Routing refill request to provider for review/approval because:  Drug not on the [facility], [facility] or [facility] refill protocol or controlled substance

## 2021-01-13 RX ORDER — LISDEXAMFETAMINE DIMESYLATE 60 MG/1
60 CAPSULE ORAL DAILY
Qty: 30 CAPSULE | Refills: 0 | Status: SHIPPED | OUTPATIENT
Start: 2021-01-13 | End: 2021-04-02

## 2021-01-15 ENCOUNTER — HEALTH MAINTENANCE LETTER (OUTPATIENT)
Age: 26
End: 2021-01-15

## 2021-02-01 ENCOUNTER — VIRTUAL VISIT (OUTPATIENT)
Dept: FAMILY MEDICINE | Facility: CLINIC | Age: 26
End: 2021-02-01
Payer: COMMERCIAL

## 2021-02-01 DIAGNOSIS — F90.9 ATTENTION DEFICIT HYPERACTIVITY DISORDER (ADHD), UNSPECIFIED ADHD TYPE: ICD-10-CM

## 2021-02-01 DIAGNOSIS — F41.9 ANXIETY: ICD-10-CM

## 2021-02-01 DIAGNOSIS — F32.5 MAJOR DEPRESSION IN REMISSION (H): ICD-10-CM

## 2021-02-01 PROCEDURE — 99214 OFFICE O/P EST MOD 30 MIN: CPT | Mod: TEL | Performed by: FAMILY MEDICINE

## 2021-02-01 RX ORDER — CITALOPRAM HYDROBROMIDE 20 MG/1
30 TABLET ORAL DAILY
Qty: 45 TABLET | Refills: 3 | Status: SHIPPED | OUTPATIENT
Start: 2021-02-01 | End: 2021-12-14

## 2021-02-01 RX ORDER — LISDEXAMFETAMINE DIMESYLATE 60 MG/1
60 CAPSULE ORAL DAILY
Qty: 30 CAPSULE | Refills: 0 | Status: SHIPPED | OUTPATIENT
Start: 2021-02-01 | End: 2021-03-01

## 2021-02-01 NOTE — PROGRESS NOTES
Sofie is a 25 year old who is being evaluated via a billable telephone visit.      What phone number would you like to be contacted at? [phone]  How would you like to obtain your AVS? MyChart  Assessment & Plan     (F32.5) Major depression in remission (H)  Comment: improved   Plan: citalopram (CELEXA) 20 MG tablet            (F41.9) Anxiety  Comment: improved   Plan: citalopram (CELEXA) 20 MG tablet        Doing better on current increased dose of citalopram     (F90.9) Attention deficit hyperactivity disorder (ADHD), unspecified ADHD type  Comment: feeling on current dose   Plan: lisdexamfetamine (VYVANSE) 60 MG capsule              Discussed cares, talked about signs and symptoms of anxiety/ depression and treatment options. Willing to continue same medication dose for now    Pros/ cons of med's discussed . encouraged to see  to help and referral given .   spent sometimes counseling patient. Follow up in 3 -4 months, sooner if problem.   She is planning to return for her routine check soon as well    Patient expressed understanding and agreement with treatment plan. All patient's questions were answered, will let me know if has more later.  Medications: Rx's: Reviewed the potential side effects/complications of medications prescribed.     Monique Peacock MD  [facility]    Elisha Carrizales is a 25 year old who presents to clinic today for the following health issues     HPI       Medication Followup of Vyvanse    Taking Medication as prescribed: yes    Side Effects:  None    Medication Helping Symptoms:  Yes, need refill on all med's        When were you diagnosed with ADD.many years ago     How are you doing with your ADD since your last visit? Improved she is comfortable at current dose of 60 mg of  vyvanse     Are you having other symptoms that might be associated with depression? No, improved with citalopram higher dose and wish to continue same medication        Depression and Anxiety Follow-Up    How are you doing with your depression since your last visit? Improved-  anxiety has improved with higher dose of citalopram at 1.5 tab. occasionally she forgets to take additional half , although she thinks she has tried 40 mg  I the past it was bit too much,  so she prefers to keep it at 30 mg dose for now     How are you doing with your anxiety since your last visit?  Improved     Are you having other symptoms that might be associated with depression or anxiety? No    Have you had a significant life event? No     Do you have any concerns with your use of alcohol or other drugs? No    Social History     Tobacco Use     Smoking status: Former Smoker     Packs/day: 0.20     Types: Cigarettes     Smokeless tobacco: Never Used     Tobacco comment: quit few months ago on her own    Substance Use Topics     Alcohol use: No     Drug use: No     PHQ 4/2/2020 9/2/2020 10/27/2020   PHQ-9 Total Score 1 0 2   Q9: Thoughts of better off dead/self-harm past 2 weeks Not at all Not at all Not at all     SALOMÓN-7 SCORE 4/2/2020 9/2/2020 10/27/2020   Total Score - - -   Total Score 4 4 7     Last PHQ-9 10/27/2020   1.  Little interest or pleasure in doing things 0   2.  Feeling down, depressed, or hopeless 0   3.  Trouble falling or staying asleep, or sleeping too much 1   4.  Feeling tired or having little energy 0   5.  Poor appetite or overeating 0   6.  Feeling bad about yourself 0   7.  Trouble concentrating 1   8.  Moving slowly or restless 0   Q9: Thoughts of better off dead/self-harm past 2 weeks 0   PHQ-9 Total Score 2   Difficulty at work, home, or with people Somewhat difficult     SALOMÓN-7  10/27/2020   1. Feeling nervous, anxious, or on edge 1   2. Not being able to stop or control worrying 0   3. Worrying too much about different things 1   4. Trouble relaxing 0   5. Being so restless that it is hard to sit still 3   6. Becoming easily annoyed or irritable 1   7. Feeling afraid, as  if something awful might happen 1   SALOMÓN-7 Total Score 7   If you checked any problems, how difficult have they made it for you to do your work, take care of things at home, or get along with other people? Not difficult at all       Suicide Assessment Five-step Evaluation and Treatment (SAFE-T)        Review of Systems   Constitutional, HEENT, cardiovascular, pulmonary, GI, , musculoskeletal, neuro, skin, endocrine and psych systems are negative, except as otherwise noted.      Objective           Vitals:  No vitals were obtained today due to virtual visit.    Physical Exam   healthy, alert and no distress  PSYCH: Alert and oriented times 3; coherent speech, normal   rate and volume, able to articulate logical thoughts, able   to abstract reason, no tangential thoughts, no hallucinations   or delusions  Her affect is normal  RESP: No cough, no audible wheezing, able to talk in full sentences  Remainder of exam unable to be completed due to telephone visits        Phone call duration: 22  minutes

## 2021-03-01 ENCOUNTER — MYC REFILL (OUTPATIENT)
Dept: FAMILY MEDICINE | Facility: CLINIC | Age: 26
End: 2021-03-01

## 2021-03-01 DIAGNOSIS — F90.9 ATTENTION DEFICIT HYPERACTIVITY DISORDER (ADHD), UNSPECIFIED ADHD TYPE: ICD-10-CM

## 2021-03-01 RX ORDER — LISDEXAMFETAMINE DIMESYLATE 60 MG/1
60 CAPSULE ORAL DAILY
Qty: 30 CAPSULE | Refills: 0 | Status: SHIPPED | OUTPATIENT
Start: 2021-03-02 | End: 2021-06-07 | Stop reason: DRUGHIGH

## 2021-03-01 NOTE — TELEPHONE ENCOUNTER
Requested Prescriptions   Pending Prescriptions Disp Refills     lisdexamfetamine (VYVANSE) 60 MG capsule 30 capsule 0     Sig: Take 1 capsule (60 mg) by mouth daily       There is no refill protocol information for this order        Last Written Prescription Date:  2/1/21  Last Fill Quantity: 30,  # refills: 0   Last office visit: 10/27/2020 with prescribing provider:     Future Office Visit:   Next 5 appointments (look out 90 days)    Mar 08, 2021 11:00 AM  Telephone Visit with JAMES Reynoso  [facility] ([facility] [place] ) [address] [place] [id number]  [phone]

## 2021-03-08 ENCOUNTER — VIRTUAL VISIT (OUTPATIENT)
Dept: PSYCHOLOGY | Facility: CLINIC | Age: 26
End: 2021-03-08
Payer: COMMERCIAL

## 2021-03-08 DIAGNOSIS — F41.1 GAD (GENERALIZED ANXIETY DISORDER): Primary | ICD-10-CM

## 2021-03-08 DIAGNOSIS — F32.A DEPRESSIVE DISORDER: ICD-10-CM

## 2021-03-08 PROCEDURE — 90834 PSYTX W PT 45 MINUTES: CPT | Mod: 95 | Performed by: SOCIAL WORKER

## 2021-03-08 ASSESSMENT — COLUMBIA-SUICIDE SEVERITY RATING SCALE - C-SSRS
TOTAL  NUMBER OF ABORTED OR SELF INTERRUPTED ATTEMPTS PAST 3 MONTHS: NO
3. HAVE YOU BEEN THINKING ABOUT HOW YOU MIGHT KILL YOURSELF?: NO
2. HAVE YOU ACTUALLY HAD ANY THOUGHTS OF KILLING YOURSELF?: NO
4. HAVE YOU HAD THESE THOUGHTS AND HAD SOME INTENTION OF ACTING ON THEM?: NO
TOTAL  NUMBER OF INTERRUPTED ATTEMPTS PAST 3 MONTHS: NO
TOTAL  NUMBER OF INTERRUPTED ATTEMPTS LIFETIME: NO
5. HAVE YOU STARTED TO WORK OUT OR WORKED OUT THE DETAILS OF HOW TO KILL YOURSELF? DO YOU INTEND TO CARRY OUT THIS PLAN?: NO
1. IN THE PAST MONTH, HAVE YOU WISHED YOU WERE DEAD OR WISHED YOU COULD GO TO SLEEP AND NOT WAKE UP?: NO
TOTAL  NUMBER OF ABORTED OR SELF INTERRUPTED ATTEMPTS PAST LIFETIME: NO
6. HAVE YOU EVER DONE ANYTHING, STARTED TO DO ANYTHING, OR PREPARED TO DO ANYTHING TO END YOUR LIFE?: NO
6. HAVE YOU EVER DONE ANYTHING, STARTED TO DO ANYTHING, OR PREPARED TO DO ANYTHING TO END YOUR LIFE?: NO
5. HAVE YOU STARTED TO WORK OUT OR WORKED OUT THE DETAILS OF HOW TO KILL YOURSELF? DO YOU INTEND TO CARRY OUT THIS PLAN?: NO
4. HAVE YOU HAD THESE THOUGHTS AND HAD SOME INTENTION OF ACTING ON THEM?: NO
2. HAVE YOU ACTUALLY HAD ANY THOUGHTS OF KILLING YOURSELF LIFETIME?: NO
1. IN THE PAST MONTH, HAVE YOU WISHED YOU WERE DEAD OR WISHED YOU COULD GO TO SLEEP AND NOT WAKE UP?: NO
ATTEMPT LIFETIME: NO
ATTEMPT PAST THREE MONTHS: NO

## 2021-03-08 ASSESSMENT — ANXIETY QUESTIONNAIRES
6. BECOMING EASILY ANNOYED OR IRRITABLE: NEARLY EVERY DAY
7. FEELING AFRAID AS IF SOMETHING AWFUL MIGHT HAPPEN: NOT AT ALL
GAD7 TOTAL SCORE: 10
5. BEING SO RESTLESS THAT IT IS HARD TO SIT STILL: NEARLY EVERY DAY
2. NOT BEING ABLE TO STOP OR CONTROL WORRYING: SEVERAL DAYS
1. FEELING NERVOUS, ANXIOUS, OR ON EDGE: SEVERAL DAYS
3. WORRYING TOO MUCH ABOUT DIFFERENT THINGS: SEVERAL DAYS
IF YOU CHECKED OFF ANY PROBLEMS ON THIS QUESTIONNAIRE, HOW DIFFICULT HAVE THESE PROBLEMS MADE IT FOR YOU TO DO YOUR WORK, TAKE CARE OF THINGS AT HOME, OR GET ALONG WITH OTHER PEOPLE: SOMEWHAT DIFFICULT

## 2021-03-08 ASSESSMENT — PATIENT HEALTH QUESTIONNAIRE - PHQ9
5. POOR APPETITE OR OVEREATING: SEVERAL DAYS
SUM OF ALL RESPONSES TO PHQ QUESTIONS 1-9: 8

## 2021-03-08 NOTE — PROGRESS NOTES
"                     Progress Note - Initial Visit    Client Name:  Sofie Padilla Date: 3/8/21         Service Type: Individual     Visit Start Time: 11:10am  Visit End Time: 12:00pm    Visit #: 1    Attendees: Client attended alone    Service Modality:  Phone Visit:      Provider verified identity through the following two step process.  Patient provided:  Patient     The patient has been notified of the following:      \"We have found that certain health care needs can be provided without the need for a face to face visit.  This service lets us provide the care you need with a phone conversation.       I will have full access to your [facility] medical record during this entire phone call.   I will be taking notes for your medical record.      Since this is like an office visit, we will bill your insurance company for this service.       There are potential benefits and risks of telephone visits (e.g. limits to patient confidentiality) that differ from in-person visits.?Confidentiality still applies for telephone services, and nobody will record the visit.  It is important to be in a quiet, private space that is free of distractions (including cell phone or other devices) during the visit.??      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  She has 11 siblings, birth order #10. In July she is getting  and moving to [place] where her two sisters reside and where her fiance is attending college. Her parents reside a few miles away from her current home.  She is involved with [employer].  She has anxiety regarding meeting new people.  She indicates that she sometimes self sabotages relationships when feeling " depressed.       ASSESSMENT:  Mental Status Assessment:  Appearance:   unable to assess due to phone visit   Eye Contact:   unable to assess due to phone visit   Psychomotor Behavior: unable to assess due to phone visit   Attitude:   Cooperative   Orientation:   All  Speech   Rate / Production: Normal/ Responsive   Volume:  Normal   Mood:    Anxious   Affect:    unable to assess due to phone visit    Thought Content:  Rumination   Thought Form:  Coherent   Insight:    Good       Safety Issues and Plan for Safety and Risk Management:     [place] Suicide Severity Rating Scale (Lifetime/Recent)  [place] Suicide Severity Rating (Lifetime/Recent) 3/8/2021   1. Wish to be Dead (Lifetime) No   1. Wish to be Dead (Recent) No   2. Non-Specific Active Suicidal Thoughts (Lifetime) No   2. Non-Specific Active Suicidal Thoughts (Recent) No   3. Active Suicidal Ideation with any Methods (Not Plan) Without Intent to Act (Lifetime) No   3. Active Suicidal Ideation with any Methods (Not Plan) Without Intent to Act (Recent) No   4. Active Suicidal Ideation with Some Intent to Act, Without Specific Plan (Lifetime) No   4. Active Suicidal Ideation with Some Intent to Act, Without Specific Plan (Recent) No   5. Active Suicidal Ideation with Specific Plan and Intent (Lifetime) No   5. Active Suicidal Ideation with Specific Plan and Intent (Recent) No   Most Severe Ideation Rating (Lifetime) NA   Frequency (Lifetime) NA   Duration (Lifetime) NA   Controllability (Lifetime) NA   Protective Factors  (Lifetime) NA   Reasons for Ideation (Lifetime) NA   Most Severe Ideation Rating (Past Month) NA   Frequency (Past Month) NA   Controllability (Past Month) NA   Protective Factors (Past Month) NA   Reasons for Ideation (Past Month) NA   Actual Attempt (Lifetime) No   Actual Attempt (Past 3 Months) No   Has subject engaged in non-suicidal self-injurious behavior? (Lifetime) No   Has subject engaged in non-suicidal self-injurious behavior? (Past  3 Months) No   Interrupted Attempts (Lifetime) No   Interrupted Attempts (Past 3 Months) No   Aborted or Self-Interrupted Attempt (Lifetime) No   Aborted or Self-Interrupted Attempt (Past 3 Months) No   Preparatory Acts or Behavior (Lifetime) No   Preparatory Acts or Behavior (Past 3 Months) No   Most Recent Attempt Actual Lethality Code NA   Most Lethal Attempt Actual Lethality Code NA     Patient denies current fears or concerns for personal safety.  Patient denies current or recent suicidal ideation or behaviors.  Patient denies current or recent homicidal ideation or behaviors.  Patient denies current or recent self injurious behavior or ideation.  Patient denies other safety concerns.  Recommended that patient call 911 or go to the local ED should there be a change in any of these risk factors.  Patient reports there are no firearms in the house.     Diagnostic Criteria:  A. Excessive anxiety and worry about a number of events or activities (such as work or school performance).   B. The person finds it difficult to control the worry.  C. Select 3 or more symptoms (required for diagnosis). Only one item is required in children.   - Restlessness or feeling keyed up or on edge.    - Being easily fatigued.    - Difficulty concentrating or mind going blank.    - Irritability.    - Sleep disturbance (difficulty falling or staying asleep, or restless unsatisfying sleep).   D. The focus of the anxiety and worry is not confined to features of an Axis I disorder.  E. The anxiety, worry, or physical symptoms cause clinically significant distress or impairment in social, occupational, or other important areas of functioning.   F. The disturbance is not due to the direct physiological effects of a substance (e.g., a drug of abuse, a medication) or a general medical condition (e.g., hyperthyroidism) and does not occur exclusively during a Mood Disorder, a Psychotic Disorder, or a Pervasive Developmental Disorder.   -  Depressed mood. Note: In children and adolescents, can be irritable mood.     - Diminished interest or pleasure in all, or almost all, activities.    - Feelings of worthlessness or excessive guilt.    - Diminished ability to think or concentrate, or indecisiveness.       DSM5 Diagnoses: (Sustained by DSM5 Criteria Listed Above)  Diagnoses: 311 (F32.9) Unspecified Depressive Disorder   300.02 (F41.1) Generalized Anxiety Disorder  Psychosocial & Contextual Factors: see current stressors  WHODAS 2.0 (12 item):   WHODAS 2.0 Total Score 3/8/2021   Total Score 23     Intervention:   Educated on treatment planning and started identifying goals and interventions for treatment plan. Completed WHODAS, C-SSRS, and CAGE.  Provided engagement regarding history and concerns.   Collateral Reports Completed:  Not Applicable      PLAN: (Homework, other):  Provider will continue Diagnostic Assessment.  Patient was given the following to do until next session:  Use positive thinking.           JAMES Reynoso  March 8, 2021

## 2021-03-09 ASSESSMENT — ANXIETY QUESTIONNAIRES: GAD7 TOTAL SCORE: 10

## 2021-04-02 ENCOUNTER — MYC MEDICAL ADVICE (OUTPATIENT)
Dept: FAMILY MEDICINE | Facility: CLINIC | Age: 26
End: 2021-04-02

## 2021-04-02 DIAGNOSIS — F90.9 ATTENTION DEFICIT HYPERACTIVITY DISORDER (ADHD), UNSPECIFIED ADHD TYPE: ICD-10-CM

## 2021-04-02 RX ORDER — LISDEXAMFETAMINE DIMESYLATE 60 MG/1
60 CAPSULE ORAL DAILY
Qty: 30 CAPSULE | Refills: 0 | Status: SHIPPED | OUTPATIENT
Start: 2021-04-02 | End: 2021-04-05

## 2021-04-02 NOTE — TELEPHONE ENCOUNTER
Controlled Substance Refill Request for  Vyvanse 60mg  Problem List Complete:  No   *Pt states that she is out of medication and needs urgently*  PROVIDER TO CONSIDER COMPLETION OF PROBLEM LIST AND OVERVIEW/CONTROLLED SUBSTANCE AGREEMENT    Last Written Prescription Date:  3-2-21  Last Fill Quantity: 30,   # refills: 0    Last [facility] Visit with [facility]: 2-1-21    Future Office visit:     Controlled substance agreement:   Encounter-Level CSA:    There are no encounter-level csa.     Patient-Level CSA:    There are no patient-level csa.         Last Urine Drug Screen: No results found for: CDAUT, No results found for: COMDAT, No results found for: THC13, PCP13, COC13, MAMP13, OPI13, AMP13, BZO13, TCA13, MTD13, BAR13, OXY13, PPX13, BUP13     Processing:  Rx to be electronically transmitted to pharmacy by provider      [email or web address]       checked in past 3 months?  No, route to RN

## 2021-04-05 ENCOUNTER — NURSE TRIAGE (OUTPATIENT)
Dept: NURSING | Facility: CLINIC | Age: 26
End: 2021-04-05

## 2021-04-05 DIAGNOSIS — F90.9 ATTENTION DEFICIT HYPERACTIVITY DISORDER (ADHD), UNSPECIFIED ADHD TYPE: ICD-10-CM

## 2021-04-05 RX ORDER — LISDEXAMFETAMINE DIMESYLATE 60 MG/1
60 CAPSULE ORAL DAILY
Qty: 30 CAPSULE | Refills: 0 | Status: SHIPPED | OUTPATIENT
Start: 2021-04-05 | End: 2021-05-05

## 2021-04-05 NOTE — TELEPHONE ENCOUNTER
S/w pharmacist at [facility] and canceled rx for vyvanse 60 mg that was sent in on 4/2/21.    Advised a new rx was sent today 4/5 to [place].  Pharmacist canceled rx.    María POOL RN  EP Triage

## 2021-04-05 NOTE — TELEPHONE ENCOUNTER
Patient requesting prescription for Vyvanse be sent to [facility] at [address] in [place], [place] as she wasn't able to pick it up at pharmacy in [place] prior to her leaving for [place].      Alaina Terrazas RN  [facility] Triage Nurse Advisor    Please close encounter when completed.

## 2021-04-05 NOTE — TELEPHONE ENCOUNTER
Please see message below and advise.    Pharmacy pended.  Dr. Juan sent in rx on 4/2/21 but pt did not  rx.  According to  last picked up on 3/2/21.    María POOL RN  EP Triage

## 2021-04-05 NOTE — TELEPHONE ENCOUNTER
vyvanse sent to [place] pharmacy, please cancel the one at her local pharmacy.  Thanks!    Akua Juan MD

## 2021-04-08 NOTE — TELEPHONE ENCOUNTER
Patient calling to inform that the pharmacy in [place] told her that they have still not heard from the doctor.    Spoke Jessica, Pharmacy Technician at [facility] in [place]. She states that they do have the prescription on file, but that they do not have it in stock.    [facility] will notify the patient that they may be able to fill tomorrow afternoon.  Sandra Raygoza RN

## 2021-05-05 ENCOUNTER — MYC REFILL (OUTPATIENT)
Dept: FAMILY MEDICINE | Facility: CLINIC | Age: 26
End: 2021-05-05

## 2021-05-05 DIAGNOSIS — F90.9 ATTENTION DEFICIT HYPERACTIVITY DISORDER (ADHD), UNSPECIFIED ADHD TYPE: ICD-10-CM

## 2021-05-05 NOTE — TELEPHONE ENCOUNTER
Controlled Substance Refill Request for Lisdexamfetamine 60mg  Problem List Complete:  No     PROVIDER TO CONSIDER COMPLETION OF PROBLEM LIST AND OVERVIEW/CONTROLLED SUBSTANCE AGREEMENT    Last Written Prescription Date:  4-5-21  Last Fill Quantity: 30,   # refills: 0      Last Office Visit with [facility] primary care provider: 2-1-21    Future Office visit:     Controlled substance agreement:   Encounter-Level CSA:    There are no encounter-level csa.     Patient-Level CSA:    There are no patient-level csa.         Last Urine Drug Screen: No results found for: CDAUT, No results found for: COMDAT, No results found for: THC13, PCP13, COC13, MAMP13, OPI13, AMP13, BZO13, TCA13, MTD13, BAR13, OXY13, PPX13, BUP13     Processing:  Rx to be electronically transmitted to pharmacy by provider      [email or web address]       checked in past 3 months?  No, route to RN- unable to access .     Clari Green RN

## 2021-05-06 RX ORDER — LISDEXAMFETAMINE DIMESYLATE 60 MG/1
60 CAPSULE ORAL DAILY
Qty: 30 CAPSULE | Refills: 0 | Status: SHIPPED | OUTPATIENT
Start: 2021-05-06 | End: 2021-06-07 | Stop reason: DRUGHIGH

## 2021-05-06 NOTE — TELEPHONE ENCOUNTER
Script refill faxed. Remind pt to do follow up for routine physical  and labs, since she is past due.

## 2021-06-07 ENCOUNTER — OFFICE VISIT (OUTPATIENT)
Dept: FAMILY MEDICINE | Facility: CLINIC | Age: 26
End: 2021-06-07
Payer: COMMERCIAL

## 2021-06-07 VITALS
WEIGHT: 137 LBS | BODY MASS INDEX: 24.27 KG/M2 | TEMPERATURE: 97.2 F | RESPIRATION RATE: 16 BRPM | HEIGHT: 63 IN | DIASTOLIC BLOOD PRESSURE: 64 MMHG | HEART RATE: 87 BPM | OXYGEN SATURATION: 98 % | SYSTOLIC BLOOD PRESSURE: 112 MMHG

## 2021-06-07 DIAGNOSIS — F32.5 MAJOR DEPRESSION IN REMISSION (H): ICD-10-CM

## 2021-06-07 DIAGNOSIS — F90.2 ATTENTION DEFICIT HYPERACTIVITY DISORDER (ADHD), COMBINED TYPE: Primary | ICD-10-CM

## 2021-06-07 DIAGNOSIS — F41.9 ANXIETY: ICD-10-CM

## 2021-06-07 PROBLEM — Z72.0 TOBACCO ABUSE: Status: RESOLVED | Noted: 2018-11-21 | Resolved: 2021-06-07

## 2021-06-07 PROCEDURE — 99214 OFFICE O/P EST MOD 30 MIN: CPT | Performed by: FAMILY MEDICINE

## 2021-06-07 RX ORDER — LISDEXAMFETAMINE DIMESYLATE 50 MG/1
50 CAPSULE ORAL EVERY MORNING
Qty: 30 CAPSULE | Refills: 0 | Status: SHIPPED | OUTPATIENT
Start: 2021-06-07 | End: 2021-07-07

## 2021-06-07 ASSESSMENT — MIFFLIN-ST. JEOR: SCORE: 1341.59

## 2021-06-07 ASSESSMENT — PAIN SCALES - GENERAL: PAINLEVEL: NO PAIN (0)

## 2021-06-07 NOTE — PROGRESS NOTES
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 Willing to continue  citalopram to 30 mg. Willing to go down on vyvanse dose to 50 mg, and may consider going off if needed as she will not be able to continue if get pregnant.   Pros/ cons of med's discussed . encouraged to see  to help  . spent sometimes counseling patient. Follow up in 2- months, sooner if problem    Patient expressed understanding and agreement with treatment plan. All patient's questions were answered, will let me know if has more later.  Medications: Rx's: Reviewed the potential side effects/complications of medications prescribed.   .   Monique Peacock MD      Patient expressed understanding and agreement with treatment plan. All patient's questions were answered, will let me know if has more later.  Medications: Rx's: Reviewed the potential side effects/complications of medications prescribed.     [facility] TRES Carrizales is a 25 year old who presents for the following health issues      HPI     Medication Followup of vyvanse    Taking Medication as prescribed: yes    Side Effects:  None    Medication Helping Symptoms:  yes         ADHD     When were you diagnosed with ADHD. For many years  has impulsivity - ' spend too much money etc\"      medication has made a difference , but now getting  in July , so has questions about medicine if she were to get pregnant. She  Does not believe  in contraception bc of Restorationism belief but planning to pay " attention to not get pregnant soon enough but wants  to work on going down on medications . Has not problem taking medication otherwise and she is feeling well mood wise etc     How are you doing with your ADD since your last visit? No change    Are you having other symptoms that might be associated with depression? No    Have you had a significant life event?  No     Are you having any problem taking medication ?   No    Do you have any concerns with your use of alcohol or other drugs? No    Depression and Anxiety Follow-Up      How are you doing with your depression since your last visit? No change, mood wise doing ok on current med's and no problem taking med's     How are you doing with your anxiety since your last visit?  No change    Are you having other symptoms that might be associated with depression or anxiety? No    Have you had a significant life event? OTHER: getting  in july     Do you have any concerns with your use of alcohol or other drugs? No    Social History     Tobacco Use     Smoking status: Former Smoker     Packs/day: 0.20     Types: Cigarettes     Smokeless tobacco: Never Used     Tobacco comment: quit few months ago on her own    Substance Use Topics     Alcohol use: No     Drug use: No     PHQ 10/27/2020 3/8/2021 5/6/2021   PHQ-9 Total Score 2 8 2   Q9: Thoughts of better off dead/self-harm past 2 weeks Not at all Not at all Not at all     SALOMÓN-7 SCORE 10/27/2020 3/8/2021 5/6/2021   Total Score - - -   Total Score - - 1 (minimal anxiety)   Total Score 7 10 1     Last PHQ-9 5/6/2021   1.  Little interest or pleasure in doing things 1   2.  Feeling down, depressed, or hopeless 0   3.  Trouble falling or staying asleep, or sleeping too much 1   4.  Feeling tired or having little energy 0   5.  Poor appetite or overeating 0   6.  Feeling bad about yourself 0   7.  Trouble concentrating 0   8.  Moving slowly or restless 0   Q9: Thoughts of better off dead/self-harm past 2 weeks 0   PHQ-9  Total Score 2   Difficulty at work, home, or with people -     SALOMÓN-7  5/6/2021   1. Feeling nervous, anxious, or on edge 0   2. Not being able to stop or control worrying 1   3. Worrying too much about different things 0   4. Trouble relaxing 0   5. Being so restless that it is hard to sit still 0   6. Becoming easily annoyed or irritable 0   7. Feeling afraid, as if something awful might happen 0   SALOMÓN-7 Total Score 1   If you checked any problems, how difficult have they made it for you to do your work, take care of things at home, or get along with other people? -       Suicide Assessment Five-step Evaluation and Treatment (SAFE-T)    Review of Systems   Constitutional, HEENT, cardiovascular, pulmonary, GI, , musculoskeletal, neuro, skin, endocrine and psych systems are negative, except as otherwise noted.      Objective      There were no vitals taken for this visit.  There is no height or weight on file to calculate BMI.  Physical Exam   GENERAL: healthy, alert and no distress  EYES: Eyes grossly normal to inspection, PERRL and conjunctivae and sclerae courtney  RESP: lungs clear to auscultation - no rales, rhonchi or wheezes  CV: regular rate and rhythm, normal S1 S2, no S3 or S4, no murmur, click or rub, no peripheral edema and peripheral pulses strong  ABDOMEN: soft, nontender, no hepatosplenomegaly, no masses and bowel sounds normal  SKIN: no suspicious lesions or rashes  NEURO: Normal strength and tone, mentation intact and speech normal  PSYCH: mentation appears normal, affect normal/brig

## 2021-06-07 NOTE — PATIENT INSTRUCTIONS
Take medications as directed.  Referral given to mental health provider   Cares and symptomatic cares discussed   Follow up if problem or concern

## 2021-06-23 ENCOUNTER — OFFICE VISIT (OUTPATIENT)
Dept: FAMILY MEDICINE | Facility: CLINIC | Age: 26
End: 2021-06-23
Payer: COMMERCIAL

## 2021-06-23 VITALS
TEMPERATURE: 97.9 F | RESPIRATION RATE: 14 BRPM | BODY MASS INDEX: 23.92 KG/M2 | WEIGHT: 135 LBS | HEIGHT: 63 IN | HEART RATE: 69 BPM | DIASTOLIC BLOOD PRESSURE: 64 MMHG | OXYGEN SATURATION: 100 % | SYSTOLIC BLOOD PRESSURE: 104 MMHG

## 2021-06-23 DIAGNOSIS — Z11.59 NEED FOR HEPATITIS C SCREENING TEST: ICD-10-CM

## 2021-06-23 DIAGNOSIS — F90.2 ATTENTION DEFICIT HYPERACTIVITY DISORDER (ADHD), COMBINED TYPE: ICD-10-CM

## 2021-06-23 DIAGNOSIS — Z23 NEED FOR VACCINATION: ICD-10-CM

## 2021-06-23 DIAGNOSIS — Z11.4 SCREENING FOR HIV (HUMAN IMMUNODEFICIENCY VIRUS): ICD-10-CM

## 2021-06-23 DIAGNOSIS — Z13.220 SCREENING FOR HYPERLIPIDEMIA: ICD-10-CM

## 2021-06-23 DIAGNOSIS — Z00.00 ROUTINE GENERAL MEDICAL EXAMINATION AT A HEALTH CARE FACILITY: Primary | ICD-10-CM

## 2021-06-23 DIAGNOSIS — F32.5 MAJOR DEPRESSION IN REMISSION (H): ICD-10-CM

## 2021-06-23 DIAGNOSIS — Z12.4 SCREENING FOR MALIGNANT NEOPLASM OF CERVIX: ICD-10-CM

## 2021-06-23 LAB
CHOLEST SERPL-MCNC: 194 MG/DL
GLUCOSE SERPL-MCNC: 85 MG/DL (ref 70–99)
HCV AB SERPL QL IA: NONREACTIVE
HDLC SERPL-MCNC: 77 MG/DL
HIV 1+2 AB+HIV1 P24 AG SERPL QL IA: NONREACTIVE
LDLC SERPL CALC-MCNC: 104 MG/DL
NONHDLC SERPL-MCNC: 117 MG/DL
TRIGL SERPL-MCNC: 66 MG/DL

## 2021-06-23 PROCEDURE — 80061 LIPID PANEL: CPT | Performed by: FAMILY MEDICINE

## 2021-06-23 PROCEDURE — G0145 SCR C/V CYTO,THINLAYER,RESCR: HCPCS | Performed by: FAMILY MEDICINE

## 2021-06-23 PROCEDURE — 90471 IMMUNIZATION ADMIN: CPT | Performed by: FAMILY MEDICINE

## 2021-06-23 PROCEDURE — 90651 9VHPV VACCINE 2/3 DOSE IM: CPT | Performed by: FAMILY MEDICINE

## 2021-06-23 PROCEDURE — 36415 COLL VENOUS BLD VENIPUNCTURE: CPT | Performed by: FAMILY MEDICINE

## 2021-06-23 PROCEDURE — 99213 OFFICE O/P EST LOW 20 MIN: CPT | Mod: 25 | Performed by: FAMILY MEDICINE

## 2021-06-23 PROCEDURE — 82947 ASSAY GLUCOSE BLOOD QUANT: CPT | Performed by: FAMILY MEDICINE

## 2021-06-23 PROCEDURE — 86803 HEPATITIS C AB TEST: CPT | Performed by: FAMILY MEDICINE

## 2021-06-23 PROCEDURE — 87389 HIV-1 AG W/HIV-1&-2 AB AG IA: CPT | Performed by: FAMILY MEDICINE

## 2021-06-23 PROCEDURE — 99395 PREV VISIT EST AGE 18-39: CPT | Mod: 25 | Performed by: FAMILY MEDICINE

## 2021-06-23 ASSESSMENT — MIFFLIN-ST. JEOR: SCORE: 1336.36

## 2021-06-23 ASSESSMENT — PAIN SCALES - GENERAL: PAINLEVEL: NO PAIN (0)

## 2021-06-23 NOTE — PROGRESS NOTES
SUBJECTIVE:   CC: Sofie Padilla is an 25 year old woman who presents for preventive health visit.       Patient has been advised of split billing requirements and indicates understanding: Yes  Healthy Habits:     Getting at least 3 servings of Calcium per day:  NO    Bi-annual eye exam:  Yes    Dental care twice a year:  NO    Sleep apnea or symptoms of sleep apnea:  None    Diet:  Other    Frequency of exercise:  2-3 days/week    Duration of exercise:  15-30 minutes    Taking medications regularly:  Yes    Medication side effects:  Lightheadedness and Other    PHQ-2 Total Score: 1    Additional concerns today:  Yes          PROBLEMS TO ADD ON...  Her first pap/ pelvic.never been sexually active but engaged and planning to get  in summer so wish to proceed with pap/ pelvic   Depression and Anxiety Follow-Up    How are you doing with your depression since your last visit? No change. vyvanse dose was decreased recently, so far she is doing ok and wants to continue with same dose     How are you doing with your anxiety since your last visit?  No change    Are you having other symptoms that might be associated with depression or anxiety? Yes:  some but not bad although may be part of he ADHD make he feel restless since she has decreased the dose. She also wonders if she is bipolar bc she can go through mood swings sometimes, although she is comfortable at current medication and does not want to change but she is wondering f she should see psychiatrist . She is also planning to get  and wants to have kids so really worried if she should go off med's at some point although does not feel ready yet     Have you had a significant life event? OTHER: planning to get  next month, may even move to other state i neqr future      Do you have any concerns with your use of alcohol or other drugs? No    Social History     Tobacco Use     Smoking status: Former Smoker     Packs/day: 0.20     Types:  Cigarettes     Smokeless tobacco: Never Used     Tobacco comment: quit few months ago on her own    Substance Use Topics     Alcohol use: No     Drug use: No     PHQ 10/27/2020 3/8/2021 5/6/2021   PHQ-9 Total Score 2 8 2   Q9: Thoughts of better off dead/self-harm past 2 weeks Not at all Not at all Not at all     SALOMÓN-7 SCORE 10/27/2020 3/8/2021 5/6/2021   Total Score - - -   Total Score - - 1 (minimal anxiety)   Total Score 7 10 1     Last PHQ-9 5/6/2021   1.  Little interest or pleasure in doing things 1   2.  Feeling down, depressed, or hopeless 0   3.  Trouble falling or staying asleep, or sleeping too much 1   4.  Feeling tired or having little energy 0   5.  Poor appetite or overeating 0   6.  Feeling bad about yourself 0   7.  Trouble concentrating 0   8.  Moving slowly or restless 0   Q9: Thoughts of better off dead/self-harm past 2 weeks 0   PHQ-9 Total Score 2   Difficulty at work, home, or with people -     SALOMÓN-7  5/6/2021   1. Feeling nervous, anxious, or on edge 0   2. Not being able to stop or control worrying 1   3. Worrying too much about different things 0   4. Trouble relaxing 0   5. Being so restless that it is hard to sit still 0   6. Becoming easily annoyed or irritable 0   7. Feeling afraid, as if something awful might happen 0   SALOMÓN-7 Total Score 1   If you checked any problems, how difficult have they made it for you to do your work, take care of things at home, or get along with other people? -       Suicide Assessment Five-step Evaluation and Treatment (SAFE-T)      Today's PHQ-2 Score:   PHQ-2 ( 1999 [employer]) 6/22/2021   Q1: Little interest or pleasure in doing things 1   Q2: Feeling down, depressed or hopeless 0   PHQ-2 Score 1   Q1: Little interest or pleasure in doing things Several days   Q2: Feeling down, depressed or hopeless Not at all   PHQ-2 Score 1       Abuse: Current or Past (Physical, Sexual or Emotional) - No  Do you feel safe in your environment? Yes    Have you ever done  Advance Care Planning? (For example, a Health Directive, POLST, or a discussion with a medical provider or your loved ones about your wishes): No, advance care planning information given to patient to review.  Patient declined advance care planning discussion at this time.    Social History     Tobacco Use     Smoking status: Former Smoker     Packs/day: 0.20     Types: Cigarettes     Smokeless tobacco: Never Used     Tobacco comment: quit few months ago on her own    Substance Use Topics     Alcohol use: No     If you drink alcohol do you typically have >3 drinks per day or >7 drinks per week? No    Alcohol Use 6/22/2021   Prescreen: >3 drinks/day or >7 drinks/week? Not Applicable       Reviewed orders with patient.  Reviewed health maintenance and updated orders accordingly - Yes  Patient Active Problem List   Diagnosis     Allergic rhinitis     ADHD (attention deficit hyperactivity disorder)     Anxiety     Major depression in remission (H)     Past Surgical History:   Procedure Laterality Date     PE TUBES  age 3     TYMPANOPLASTY  3/19/2013    Procedure: TYMPANOPLASTY;  Right fat graft tympanoplasty;  Surgeon: Jersey Ricardo MD;  Location:  OR       Social History     Tobacco Use     Smoking status: Former Smoker     Packs/day: 0.20     Types: Cigarettes     Smokeless tobacco: Never Used     Tobacco comment: quit few months ago on her own    Substance Use Topics     Alcohol use: No     Family History   Problem Relation Age of Onset     C.A.D. Father      Diabetes Father         Pre-diabetes     Arthritis Maternal Grandmother      Cardiovascular Maternal Grandmother         heart disease     Alzheimer Disease Paternal Grandfather      Hypertension Paternal Grandfather      Asthma Brother      Depression Mother      Depression Maternal Grandfather      Heart Disease Maternal Aunt         enlarged heart     Heart Disease Maternal Aunt         enlarged heart     Cancer Maternal Aunt      Heart Disease  Maternal Uncle         enlarged heart     Gastrointestinal Disease Maternal Uncle         colitis     Cerebrovascular Disease No family hx of      Thyroid Disease No family hx of      Glaucoma No family hx of      Macular Degeneration No family hx of            Breast Cancer Screening:        History of abnormal Pap smear: NO - age 21-29 PAP every 3 years recommended     Reviewed and updated as needed this visit by clinical staff                 Reviewed and updated as needed this visit by Provider                Past Medical History:   Diagnosis Date     Allergic rhinitis 11/2/2010     Anxiety      Keratosis pilaris 6/23/2011        Review of Systems  CONSTITUTIONAL: NEGATIVE for fever, chills, change in weight  INTEGUMENTARU/SKIN: NEGATIVE for worrisome rashes, moles or lesions  EYES: NEGATIVE for vision changes or irritation  ENT: NEGATIVE for ear, mouth and throat problems  RESP: NEGATIVE for significant cough or SOB  BREAST: NEGATIVE for masses, tenderness or discharge  CV: NEGATIVE for chest pain, palpitations or peripheral edema  GI: NEGATIVE for nausea, abdominal pain, heartburn, or change in bowel habits  : NEGATIVE for unusual urinary or vaginal symptoms. Periods are regular.  MUSCULOSKELETAL: NEGATIVE for significant arthralgias or myalgia  NEURO: NEGATIVE for weakness, dizziness or paresthesias  ENDOCRINE: NEGATIVE for temperature intolerance, skin/hair changes  HEME/ALLERGY/IMMUNE: NEGATIVE for bleeding problems  PSYCHIATRIC: POSITIVE for mood swings , concentration difficulty, HX anxiety and HX depression, and sometimes goes through extreme mood so debating to see psychiatrist      OBJECTIVE:   There were no vitals taken for this visit.  Physical Exam  GENERAL: healthy, alert and no distress  EYES: Eyes grossly normal to inspection, PERRL and conjunctivae and sclerae normal  HENT: ear canals and TM's normal, nose and mouth without ulcers or lesions  NECK: no adenopathy, no asymmetry, masses, or scars  and thyroid normal to palpation  RESP: lungs clear to auscultation - no rales, rhonchi or wheezes  BREAST: normal without masses, tenderness or nipple discharge and no palpable axillary masses or adenopathy  CV: regular rate and rhythm, normal S1 S2, no S3 or S4, no murmur, click or rub, no peripheral edema and peripheral pulses strong  ABDOMEN: soft, nontender, no hepatosplenomegaly, no masses and bowel sounds normal   (female): normal female external genitalia, normal urethral meatus , vaginal mucosa pink, moist, well rugated and normal cervix, adnexae, and uterus without masses.  RECTAL: defered   MS: no gross musculoskeletal defects noted, no edema  SKIN: no suspicious lesions or rashes  NEURO: Normal strength and tone, mentation intact and speech normal  PSYCH: mentation appears normal, affect normal/slightly uptight/ speech pressured but coherent  ,insight intact         ASSESSMENT/PLAN:   (Z00.00) Routine general medical examination at a health care facility  (primary encounter diagnosis)  Comment:   Plan: Lipid panel reflex to direct LDL Fasting, HIV         Antigen Antibody Combo, Glucose  Pap imaged thin layer screen only - recommended        age 21 - 24 years          (Z12.4) Screening for malignant neoplasm of cervix  Comment:   Plan: Pap imaged thin layer screen only - recommended        age 21 - 24 years              (Z13.220) Screening for hyperlipidemia  Comment:   Plan: Lipid panel reflex to direct LDL Fasting            (Z11.4) Screening for HIV (human immunodeficiency virus)  Comment:   Plan: HIV Antigen Antibody Combo            (Z11.59) Need for hepatitis C screening test  Comment:   Plan: Hepatitis C Screen Reflex to HCV RNA Quant and         Genotype                (F90.2) Attention deficit hyperactivity disorder (ADHD), combined type  Comment: trying to reduce the dose  And considering going off med's   Plan: MENTAL HEALTH REFERRAL  - Adult; Psychiatry;         Psychiatry; [facility]: Psychiatry  "Clinic [phone]; We will contact you to schedule the         appointment or please call with any questions            (F32.5) Major depression in remission (H)  Comment:   Plan: MENTAL HEALTH REFERRAL  - Adult; Psychiatry;         Psychiatry; [facility]: Psychiatry Clinic [phone]; We will contact you to schedule the         appointment or please call with any questions            Discussed cares, talked about signs and symptoms of anxiety/ depression and treatment options. Willing to continue same dose of med's now.  Pros/ cons of med's discussed . Also wish to proceed with psychiatrist evaluation as she is worried if she could e bipolar vs just ADHD . encouraged to see  to help and referral  Has been given previously . spent sometimes counseling patient. Follow up in 2-3 months, sooner if problem.       Check labs. Cares and  treatment discussed. follow up if problem   Patient expressed understanding and agreement with treatment plan. All patient's questions were answered, will let me know if has more later.  Medications: Rx's: Reviewed the potential side effects/complications of medications prescribed.     COUNSELING:  Reviewed preventive health counseling, as reflected in patient instructions       Regular exercise       Healthy diet/nutrition       Vision screening       Osteoporosis prevention/bone health       Consider Hep C screening for all patients one time for ages 18-79 years       HIV screeninx in teen years, 1x in adult years, and at intervals if high risk    Estimated body mass index is 24.46 kg/m  as calculated from the following:    Height as of 23: 1.594 m (5' 2.75\").    Weight as of 23: 62.1 kg (137 lb).        She reports that she has quit smoking. Her smoking use included cigarettes. She smoked 0.20 packs per day. She has never used smokeless tobacco.      Counseling Resources:  ATP IV Guidelines  Pooled Cohorts Equation Calculator  Breast Cancer Risk " Calculator  BRCA-Related Cancer Risk Assessment: FHS-7 Tool  FRAX Risk Assessment  ICSI Preventive Guidelines  Dietary Guidelines for Americans, 2010  USDA's MyPlate  ASA Prophylaxis  Lung CA Screening    Monique Peacock MD  [facility]

## 2021-06-25 LAB
COPATH REPORT: NORMAL
PAP: NORMAL

## 2021-09-04 ENCOUNTER — HEALTH MAINTENANCE LETTER (OUTPATIENT)
Age: 26
End: 2021-09-04

## 2021-10-19 ENCOUNTER — MYC MEDICAL ADVICE (OUTPATIENT)
Dept: FAMILY MEDICINE | Facility: CLINIC | Age: 26
End: 2021-10-19

## 2021-10-19 DIAGNOSIS — F90.2 ATTENTION DEFICIT HYPERACTIVITY DISORDER (ADHD), COMBINED TYPE: ICD-10-CM

## 2021-10-20 NOTE — TELEPHONE ENCOUNTER
lisdexamfetamine 50 mg      Last Written Prescription Date:  8/10/21  Last Fill Quantity: 30,   # refills: 0  Last Office Visit: 6/23/21 Ayush  Future Office visit:       Routing refill request to provider for review/approval because:  Drug not on the [facility], [facility] or  Health refill protocol or controlled substance    RX [place]) reviewed:  not reviewed/not due - last done on 5/6/21    [place]  [email or web address]    María POOL RN  EP Triage

## 2021-10-21 RX ORDER — LISDEXAMFETAMINE DIMESYLATE 50 MG/1
50 CAPSULE ORAL EVERY MORNING
Qty: 30 CAPSULE | Refills: 0 | Status: SHIPPED | OUTPATIENT
Start: 2021-10-21 | End: 2021-10-26

## 2021-10-21 NOTE — TELEPHONE ENCOUNTER
Script refill printed.    Also no pharmacy was linked, prescription printed.    We do need to verify with the patient to make sure she is taking to which pharmacy she will take this prescription to be filled .   Not even sure if this will qualify for out-of-state  Refill or not   Also  if she is out of state, she will have to establish with a provider over there to prescribe her medication for ongoing needs.

## 2021-10-22 NOTE — TELEPHONE ENCOUNTER
Called pt left vm asking what pharmacy she wants this sent too. On call back ask which [facility] sees two on file.   Tamiko Plaza

## 2021-10-25 NOTE — TELEPHONE ENCOUNTER
Pt called back saying pharmacy never received prescription. TC called pharmacy they cannot accept faxed vyvanse. Would provider be able to send it to pended pharmacy?  Tamiko Plaza

## 2021-10-25 NOTE — TELEPHONE ENCOUNTER
Pt called back and said the [facility] never recived the medication. TC faxed to [facility] on [address] in [place] . Fax is [phone].   Tamiko Plaza

## 2021-10-26 RX ORDER — LISDEXAMFETAMINE DIMESYLATE 50 MG/1
50 CAPSULE ORAL EVERY MORNING
Qty: 30 CAPSULE | Refills: 0 | Status: SHIPPED | OUTPATIENT
Start: 2021-10-26

## 2021-12-12 DIAGNOSIS — F41.9 ANXIETY: ICD-10-CM

## 2021-12-12 DIAGNOSIS — F32.5 MAJOR DEPRESSION IN REMISSION (H): ICD-10-CM

## 2021-12-14 RX ORDER — CITALOPRAM HYDROBROMIDE 20 MG/1
TABLET ORAL
Qty: 45 TABLET | Refills: 0 | Status: SHIPPED | OUTPATIENT
Start: 2021-12-14

## 2021-12-14 NOTE — TELEPHONE ENCOUNTER
Sent phq9 via melissa MCMILLANRN BSN  [facility]  [phone]

## 2021-12-21 NOTE — TELEPHONE ENCOUNTER
Pt has not read [employer] message, another message has been left for pt to call back.Jose VALLES CMA

## 2022-08-06 ENCOUNTER — HEALTH MAINTENANCE LETTER (OUTPATIENT)
Age: 27
End: 2022-08-06

## 2022-10-22 ENCOUNTER — HEALTH MAINTENANCE LETTER (OUTPATIENT)
Age: 27
End: 2022-10-22

## 2023-08-27 ENCOUNTER — HEALTH MAINTENANCE LETTER (OUTPATIENT)
Age: 28
End: 2023-08-27